# Patient Record
Sex: MALE | Race: BLACK OR AFRICAN AMERICAN | Employment: UNEMPLOYED | URBAN - METROPOLITAN AREA
[De-identification: names, ages, dates, MRNs, and addresses within clinical notes are randomized per-mention and may not be internally consistent; named-entity substitution may affect disease eponyms.]

---

## 2018-01-17 ENCOUNTER — HOSPITAL ENCOUNTER (OUTPATIENT)
Dept: SLEEP CENTER | Age: 53
Discharge: HOME | End: 2018-01-17
Attending: INTERNAL MEDICINE | Admitting: INTERNAL MEDICINE

## 2018-01-17 VITALS
DIASTOLIC BLOOD PRESSURE: 89 MMHG | WEIGHT: 233.3 LBS | HEIGHT: 71 IN | HEART RATE: 72 BPM | BODY MASS INDEX: 32.66 KG/M2 | SYSTOLIC BLOOD PRESSURE: 136 MMHG

## 2018-01-17 DIAGNOSIS — G47.33 OSA (OBSTRUCTIVE SLEEP APNEA): Primary | ICD-10-CM

## 2018-01-17 RX ORDER — LANCETS 28 GAUGE
EACH MISCELLANEOUS
COMMUNITY

## 2018-01-17 RX ORDER — AMLODIPINE BESYLATE 5 MG/1
5 TABLET ORAL
COMMUNITY
Start: 2017-11-18

## 2018-01-17 RX ORDER — CARVEDILOL 12.5 MG/1
12.5 TABLET ORAL
COMMUNITY
Start: 2017-11-17 | End: 2020-11-25

## 2018-01-17 RX ORDER — BUPROPION HYDROCHLORIDE 150 MG/1
TABLET, EXTENDED RELEASE ORAL
COMMUNITY
Start: 2017-11-17

## 2018-01-17 RX ORDER — ATORVASTATIN CALCIUM 40 MG/1
40 TABLET, FILM COATED ORAL
COMMUNITY

## 2018-01-17 RX ORDER — PANTOPRAZOLE SODIUM 40 MG/1
40 TABLET, DELAYED RELEASE ORAL
COMMUNITY
Start: 2014-09-15 | End: 2020-11-25

## 2018-01-17 RX ORDER — GLIPIZIDE 10 MG/1
1 TABLET, FILM COATED, EXTENDED RELEASE ORAL
COMMUNITY
End: 2020-11-25

## 2018-01-17 RX ORDER — VALSARTAN AND HYDROCHLOROTHIAZIDE 160; 12.5 MG/1; MG/1
1 TABLET, FILM COATED ORAL
COMMUNITY
End: 2020-11-25

## 2018-01-17 RX ORDER — PREGABALIN 50 MG/1
CAPSULE ORAL
COMMUNITY
Start: 2015-11-23 | End: 2020-11-25

## 2018-01-17 ASSESSMENT — SLEEP AND FATIGUE QUESTIONNAIRES
HOW LIKELY ARE YOU TO NOD OFF OR FALL ASLEEP WHILE SITTING AND READING: 3
HOW LIKELY ARE YOU TO NOD OFF OR FALL ASLEEP WHILE LYING DOWN TO REST IN THE AFTERNOON WHEN CIRCUMSTANCES PERMIT: 2
HOW LIKELY ARE YOU TO NOD OFF OR FALL ASLEEP WHEN YOU ARE A PASSENGER IN A CAR FOR AN HOUR WITHOUT A BREAK: 3
HOW LIKELY ARE YOU TO NOD OFF OR FALL ASLEEP IN A CAR, WHILE STOPPED FOR A FEW MINUTES IN TRAFFIC: 3
HOW LIKELY ARE YOU TO NOD OFF OR FALL ASLEEP WHILE SITTING INACTIVE IN A PUBLIC PLACE: 3
NECK CIRCUMFERENCE (INCHES): 17.5
HOW LIKELY ARE YOU TO NOD OFF OR FALL ASLEEP WHILE SITTING QUIETLY AFTER LUNCH WITHOUT ALCOHOL: 2
HOW LIKELY ARE YOU TO NOD OFF OR FALL ASLEEP WHILE SITTING AND TALKING TO SOMEONE: 3
HOW LIKELY ARE YOU TO NOD OFF OR FALL ASLEEP WHILE WATCHING TV: 3
ESS TOTAL SCORE: 22

## 2018-01-17 NOTE — PROGRESS NOTES
Tolerates Well   []  Patient Does Not Tolerate     []  Patient Uses CPAP      []  More Than 4 Hours      []  Less Than 4 Hours  []  CPAP/BPAP/ASV Pressure Readings   []  CPAP Pressure      cm H20   []  BPAP Pressure       cm H20   []  ASV Pressure         cm H20      Assessment:      Diagnosis:   ANA MARÍA;                                     Hypersomnia       Plan:        Sleep Study:     [x]  Sleep hygiene/ relaxation methods & CBTi principles review with patient     []  HST - Home Sleep Study   [x]  PSG - Overnight Diagnostic Polysomnogram     []  CPAP Titration    [] Split Night Study    [] BiLevel Titration    [] ASV - Auto-Servo Ventilation Titration       []  MSLT - Multiple Sleep Latency Test   []  MWT - Maintenance of Wakefulness Test    CPAP Therapy:     []  Patient to be seen for new mask fitting/desensitization   []  AutoPAP Titration    []  CPAP supplies and equipment at ________cmH2O    []  Continue same CPAP pressure   []  Change CPAP pressure to _______cm H2O   []  CPAP supplies only, no pressure change   []  Refer for an oral appliance       Medications:       []  Continue current medication    []  Add Medication:  ________________    Follow-Up:     []  No follow up required. Patient to return as needed. []  2 weeks   []  4 weeks   []  2 months   []  4 months   []  6 months   []  1 year for CPAP compliance evaluation. Patient to return sooner, as needed. [x]  Follow up after sleep study   []  Other: ______________    No orders of the defined types were placed in this encounter.          Electronically signed by Terri Kussmaul, MD on 1/17/2018 at 12:06 PM

## 2018-02-01 ENCOUNTER — HOSPITAL ENCOUNTER (OUTPATIENT)
Dept: SLEEP CENTER | Age: 53
Discharge: HOME | End: 2018-02-01
Attending: PSYCHIATRY & NEUROLOGY | Admitting: PSYCHIATRY & NEUROLOGY

## 2018-02-01 VITALS — HEIGHT: 71 IN | BODY MASS INDEX: 32.62 KG/M2 | WEIGHT: 233 LBS

## 2018-02-01 ASSESSMENT — SLEEP AND FATIGUE QUESTIONNAIRES
NECK CIRCUMFERENCE (INCHES): 17.5
HOW LIKELY ARE YOU TO NOD OFF OR FALL ASLEEP WHILE SITTING QUIETLY AFTER LUNCH WITHOUT ALCOHOL: 2
HOW LIKELY ARE YOU TO NOD OFF OR FALL ASLEEP IN A CAR, WHILE STOPPED FOR A FEW MINUTES IN TRAFFIC: 3
HOW LIKELY ARE YOU TO NOD OFF OR FALL ASLEEP WHILE LYING DOWN TO REST IN THE AFTERNOON WHEN CIRCUMSTANCES PERMIT: 2
HOW LIKELY ARE YOU TO NOD OFF OR FALL ASLEEP WHEN YOU ARE A PASSENGER IN A CAR FOR AN HOUR WITHOUT A BREAK: 3
HOW LIKELY ARE YOU TO NOD OFF OR FALL ASLEEP WHILE SITTING AND TALKING TO SOMEONE: 3
HOW LIKELY ARE YOU TO NOD OFF OR FALL ASLEEP WHILE WATCHING TV: 3
HOW LIKELY ARE YOU TO NOD OFF OR FALL ASLEEP WHILE SITTING INACTIVE IN A PUBLIC PLACE: 3
HOW LIKELY ARE YOU TO NOD OFF OR FALL ASLEEP WHILE SITTING AND READING: 3
ESS TOTAL SCORE: 22

## 2018-02-08 NOTE — PROGRESS NOTES
Results for the most recent sleep study on Barney Crews  1965 are finalized and available. Please see media tab.     Electronically signed by Jackson Tamez on 2/8/2018 at 8:40 AM

## 2018-04-25 ENCOUNTER — HOSPITAL ENCOUNTER (OUTPATIENT)
Dept: SLEEP CENTER | Age: 53
Discharge: HOME | End: 2018-04-25
Attending: PSYCHIATRY & NEUROLOGY | Admitting: PSYCHIATRY & NEUROLOGY

## 2018-04-25 ASSESSMENT — SLEEP AND FATIGUE QUESTIONNAIRES
HOW LIKELY ARE YOU TO NOD OFF OR FALL ASLEEP WHILE SITTING AND READING: 0
HOW LIKELY ARE YOU TO NOD OFF OR FALL ASLEEP WHILE SITTING INACTIVE IN A PUBLIC PLACE: 0
HOW LIKELY ARE YOU TO NOD OFF OR FALL ASLEEP IN A CAR, WHILE STOPPED FOR A FEW MINUTES IN TRAFFIC: 0
HOW LIKELY ARE YOU TO NOD OFF OR FALL ASLEEP WHILE LYING DOWN TO REST IN THE AFTERNOON WHEN CIRCUMSTANCES PERMIT: 3
HOW LIKELY ARE YOU TO NOD OFF OR FALL ASLEEP WHILE WATCHING TV: 1
HOW LIKELY ARE YOU TO NOD OFF OR FALL ASLEEP WHILE SITTING QUIETLY AFTER LUNCH WITHOUT ALCOHOL: 1
HOW LIKELY ARE YOU TO NOD OFF OR FALL ASLEEP WHILE SITTING AND TALKING TO SOMEONE: 0
ESS TOTAL SCORE: 5
HOW LIKELY ARE YOU TO NOD OFF OR FALL ASLEEP WHEN YOU ARE A PASSENGER IN A CAR FOR AN HOUR WITHOUT A BREAK: 0

## 2018-11-02 ENCOUNTER — TELEPHONE (OUTPATIENT)
Dept: SLEEP CENTER | Age: 53
End: 2018-11-02

## 2020-11-25 ENCOUNTER — HOSPITAL ENCOUNTER (OUTPATIENT)
Dept: SLEEP CENTER | Age: 55
Discharge: HOME | End: 2020-11-25
Payer: COMMERCIAL

## 2020-11-25 VITALS — BODY MASS INDEX: 34.86 KG/M2 | WEIGHT: 249 LBS | HEIGHT: 71 IN

## 2020-11-25 PROBLEM — E66.9 OBESITY (BMI 30-39.9): Status: ACTIVE | Noted: 2020-11-25

## 2020-11-25 PROBLEM — G47.10 HYPERSOMNIA: Status: ACTIVE | Noted: 2020-11-25

## 2020-11-25 PROBLEM — Z87.891 EX-SMOKER: Status: ACTIVE | Noted: 2020-11-25

## 2020-11-25 PROBLEM — G47.33 OSA (OBSTRUCTIVE SLEEP APNEA): Status: ACTIVE | Noted: 2020-11-25

## 2020-11-25 PROCEDURE — 99423 OL DIG E/M SVC 21+ MIN: CPT | Performed by: INTERNAL MEDICINE

## 2020-11-25 PROCEDURE — 99211 OFF/OP EST MAY X REQ PHY/QHP: CPT

## 2020-11-25 RX ORDER — RIVAROXABAN 2.5 MG/1
2.5 TABLET, FILM COATED ORAL 2 TIMES DAILY
COMMUNITY

## 2020-11-25 RX ORDER — ALLOPURINOL 100 MG/1
100 TABLET ORAL DAILY
COMMUNITY

## 2020-11-25 RX ORDER — NIFEDIPINE 60 MG/1
60 TABLET, FILM COATED, EXTENDED RELEASE ORAL DAILY
COMMUNITY

## 2020-11-25 RX ORDER — CLOPIDOGREL BISULFATE 75 MG/1
75 TABLET ORAL DAILY
COMMUNITY

## 2020-11-25 RX ORDER — OMEPRAZOLE 40 MG/1
40 CAPSULE, DELAYED RELEASE ORAL DAILY
COMMUNITY

## 2020-11-25 ASSESSMENT — SLEEP AND FATIGUE QUESTIONNAIRES
HOW LIKELY ARE YOU TO NOD OFF OR FALL ASLEEP WHILE SITTING QUIETLY AFTER LUNCH WITHOUT ALCOHOL: 3
HOW LIKELY ARE YOU TO NOD OFF OR FALL ASLEEP WHILE WATCHING TV: 1
HOW LIKELY ARE YOU TO NOD OFF OR FALL ASLEEP WHILE SITTING INACTIVE IN A PUBLIC PLACE: 1
HOW LIKELY ARE YOU TO NOD OFF OR FALL ASLEEP IN A CAR, WHILE STOPPED FOR A FEW MINUTES IN TRAFFIC: 2
HOW LIKELY ARE YOU TO NOD OFF OR FALL ASLEEP WHEN YOU ARE A PASSENGER IN A CAR FOR AN HOUR WITHOUT A BREAK: 1
HOW LIKELY ARE YOU TO NOD OFF OR FALL ASLEEP WHILE SITTING AND READING: 1
HOW LIKELY ARE YOU TO NOD OFF OR FALL ASLEEP WHILE LYING DOWN TO REST IN THE AFTERNOON WHEN CIRCUMSTANCES PERMIT: 3
ESS TOTAL SCORE: 13
HOW LIKELY ARE YOU TO NOD OFF OR FALL ASLEEP WHILE SITTING AND TALKING TO SOMEONE: 1

## 2020-11-25 NOTE — CONSULTS
Phani Barrios MD, Arin Wells MD, Darian Trujillo MD, [facility]      [address], [address] [facility]   [place]: [phone]  F: [phone]     Subjective:     Patient ID: Anastasiya Yoo is a 50 y.o. male, referred to the sleep center for consultation with a sleep specialist.     Reason for Consultation/Chief Complaint:   Chief Complaint   Patient presents with    Sleep Apnea       Referring physician:  Candie Garcia    He is doing a  VIDEO CONSULT. He was seen by Dr. Arin Wells in 2018. He had a split night study done on 02/01/2018 which showed that he has severe ANA MARÍA with an AHI of 76.7 and desat to 73%. He required a CPAP of 12 cm h20. He used it for some time but has not used it for about 1 year.  He has no supplies    Symptoms:   [x]  Snoring                                                                    [x]  Dry Mouth  [x]  Choking                                                                   []  Morning Headaches  [x]  Gasping for Air                                                        []  Trouble Falling asleep  [x]  Tired during the daytime                                         []  Trouble Staying Asleep  [x]  Tired when you wake up                                         [x]  Weight Gain in Last 5 Years  [x]  Wake up frequently at night                                    []  Weight Loss in Last 5 Years  []  Shortness Of Breath                                               []  Shift Worker  []  Coughing                                                                [x]  Smoker (Previous or Current) 1/2 pk per day for 41 days which he quit 3 years ago  []  Chest Pain                                                              []  Anxiety  []  Trouble keeping your legs still at night                   []  Depression  []  Kicking your legs in your sleep []  Insomnia            []  Other: 20 lb weight gain    Significant Co-morbidities:  []  Congestive Heart Failure     []  COPD         []  Stroke (Past 30 Days)      []  Supplemental Oxygen Usage       []  Cognitive Impairment      []  Neuromuscular Problems  []  Epilepsy/Neurological Disorders         Duration of Sleep Problems:    History:    Social History     Socioeconomic History    Marital status: Legally      Spouse name: Not on file    Number of children: Not on file    Years of education: Not on file    Highest education level: Not on file   Occupational History    Not on file   Social Needs    Financial resource strain: Not on file    Food insecurity     Worry: Not on file     Inability: Not on file    Transportation needs     Medical: Not on file     Non-medical: Not on file   Tobacco Use    Smoking status: Former Smoker     Packs/day: 0.50     Types: Cigarettes     Quit date: 1/1/2018     Years since quitting: 3.0    Smokeless tobacco: Never Used   Substance and Sexual Activity    Alcohol use: No    Drug use: Never    Sexual activity: Not on file   Lifestyle    Physical activity     Days per week: Not on file     Minutes per session: Not on file    Stress: Not on file   Relationships    Social connections     Talks on phone: Not on file     Gets together: Not on file     Attends [employer] service: Not on file     Active member of club or organization: Not on file     Attends meetings of clubs or organizations: Not on file     Relationship status: Not on file    Intimate partner violence     Fear of current or ex partner: Not on file     Emotionally abused: Not on file     Physically abused: Not on file     Forced sexual activity: Not on file   Other Topics Concern    Not on file   Social History Narrative    Not on file       Prior to Admission medications    Medication Sig Start Date End Date Taking?  Authorizing Provider   NIFEdipine (ADALAT CC) 60 MG extended release tablet Take 60 mg by mouth daily   Yes Historical Provider, MD   rivaroxaban (XARELTO) 2.5 MG TABS tablet Take 2.5 mg by mouth 2 times daily   Yes Historical Provider, MD   allopurinol (ZYLOPRIM) 100 MG tablet Take 100 mg by mouth daily   Yes Historical Provider, MD   clopidogrel (PLAVIX) 75 MG tablet Take 75 mg by mouth daily   Yes Historical Provider, MD   omeprazole (PRILOSEC) 40 MG delayed release capsule Take 40 mg by mouth daily   Yes Historical Provider, MD   aspirin 81 MG tablet Take 81 mg by mouth daily  9/26/17  Yes Historical Provider, MD   atorvastatin (LIPITOR) 40 MG tablet Take 40 mg by mouth   Yes Historical Provider, MD   amLODIPine (NORVASC) 5 MG tablet Take 5 mg by mouth 11/18/17  Yes Historical Provider, MD   Dulaglutide 1.5 MG/0.5ML SOPN Inject into the skin   Yes Historical Provider, MD SKINNERYLE LANCETS MISC by Does not apply route   Yes Historical Provider, MD   insulin glargine (LANTUS) 100 UNIT/ML injection vial Inject 10 Units into the skin daily (with breakfast)    Historical Provider, MD   buPROPion (WELLBUTRIN SR) 150 MG extended release tablet 150 mg daily for 3 days then 150 mg twice daily  Indications: Smoking Cessation 11/17/17   Historical Provider, MD   acetaminophen (TYLENOL) 325 MG tablet Take 650 mg by mouth every 6 hours as needed for Pain. Historical Provider, MD       Allergies as of 11/25/2020    (No Known Allergies)       Patient Active Problem List   Diagnosis    ANA MARÍA (obstructive sleep apnea)    Hypersomnia    Obesity (BMI 30-39. 9)    Ex-smoker       Past Medical History:   Diagnosis Date    Diabetes mellitus ([facility] [address]        No past surgical history on file. No family history on file. Objective:     Vitals:    11/25/20 1422   Weight: 249 lb (112.9 kg)   Height: 5' 11\" (1.803 m)     Body mass index is 34.73 kg/m². Neck - Neck circumference: 17  Inches  [place] - Total score: 13    REVIEW OF SYSTEMS:  General: No distress.    Constitutional: Negative for fever, no distress. HENT: Negative for sore throat, external appearance of ears and nose normal.   Eyes: Negative for redness. Respiratory: Negative for dyspnea, cough. Cardiovascular: Negative for chest pain. Gastrointestinal: Negative for vomiting, diarrhea. Musculoskeletal: Negative for arthralgias. Skin: Negative for rash. Neurological: Negative for syncope. Mallampati Airway Classification:   []1 []2 [x]3 []4          Previous CPAP Usage:    []  Patient has never worn PAP. []  Patient has worn PAP previously but discontinued use. []  Current PAP user. Assessment:      Diagnosis:       Plan:     1.  2.  3Shira Peralta is a 50 y.o. male being evaluated by a Virtual Visit (video visit) encounter to address concerns as mentioned above. A caregiver was present when appropriate. Due to this being a TeleHealth encounter (During FYBEH-38 public health emergency), evaluation of the following organ systems was limited: Vitals/Constitutional/EENT/Resp/CV/GI//MS/Neuro/Skin/Heme-Lymph-Imm. Pursuant to the emergency declaration under the [address] [place] [address] authority and the [employer] and [employer] Act, this Virtual Visit was conducted with patient's (and/or legal guardian's) consent, to reduce the patient's risk of exposure to COVID-19 and provide necessary medical care. The patient (and/or legal guardian) has also been advised to contact this office for worsening conditions or problems, and seek emergency medical treatment and/or call 911 if deemed necessary. Patient identification was verified at the start of the visit: Yes    Services were provided through a video synchronous discussion virtually to substitute for in-person clinic visit. Patient and provider were located at their individual homes. --Gilford Lime, MD on 2/5/2021 at 9:23 AM    An electronic signature was used to authenticate this note. Orders Placed This Encounter   Procedures    DME Order for CPAP as OP          Electronically signed by Mark Galarza MD on 2/5/2021 at 9:23 AM

## 2020-11-25 NOTE — ASSESSMENT & PLAN NOTE
He still has symptoms of ANA MARÍA  Advised to use Auto CPAP  Loose weight  I need a 2 week download data

## 2021-02-05 ENCOUNTER — VIRTUAL VISIT (OUTPATIENT)
Dept: PULMONOLOGY | Age: 56
End: 2021-02-05
Payer: COMMERCIAL

## 2021-02-05 DIAGNOSIS — G47.10 HYPERSOMNIA: ICD-10-CM

## 2021-02-05 DIAGNOSIS — E66.9 OBESITY (BMI 30-39.9): ICD-10-CM

## 2021-02-05 DIAGNOSIS — Z87.891 EX-SMOKER: ICD-10-CM

## 2021-02-05 DIAGNOSIS — G47.33 OSA (OBSTRUCTIVE SLEEP APNEA): ICD-10-CM

## 2021-02-05 PROCEDURE — 99443 PR PHYS/QHP TELEPHONE EVALUATION 21-30 MIN: CPT | Performed by: INTERNAL MEDICINE

## 2021-02-05 RX ORDER — INSULIN GLARGINE 100 [IU]/ML
10 INJECTION, SOLUTION SUBCUTANEOUS
COMMUNITY

## 2021-02-05 ASSESSMENT — ENCOUNTER SYMPTOMS
ABDOMINAL DISTENTION: 0
COUGH: 0
SHORTNESS OF BREATH: 0
BACK PAIN: 0
ABDOMINAL PAIN: 0
EYE ITCHING: 0
EYE DISCHARGE: 0

## 2021-02-05 NOTE — PROGRESS NOTES
Christiano Coroneler  1965  Referring Provider: Rich Lynch    Subjective:     Chief Complaint   Patient presents with    Results     2 month sleep lab results. ELADIA Dimas is a 50 y.o. male is doing a telephone follow up visit. He has severe ANA MARÍA. He is on a Auto CPAP which he was using it till he went to the hospital for 1 week. He says that it is helping a little. He has no loss of weight. He is still sleepy during the day time. His 2 week download data showed that his residual AHI is 2.8 and leak is 12.9 and his 95th percentile pressure is 13.5 cm h20. Current Outpatient Medications   Medication Sig Dispense Refill    insulin glargine (LANTUS) 100 UNIT/ML injection vial Inject 10 Units into the skin daily (with breakfast)      NIFEdipine (ADALAT CC) 60 MG extended release tablet Take 60 mg by mouth daily      rivaroxaban (XARELTO) 2.5 MG TABS tablet Take 2.5 mg by mouth 2 times daily      allopurinol (ZYLOPRIM) 100 MG tablet Take 100 mg by mouth daily      clopidogrel (PLAVIX) 75 MG tablet Take 75 mg by mouth daily      omeprazole (PRILOSEC) 40 MG delayed release capsule Take 40 mg by mouth daily      aspirin 81 MG tablet Take 81 mg by mouth daily       atorvastatin (LIPITOR) 40 MG tablet Take 40 mg by mouth      amLODIPine (NORVASC) 5 MG tablet Take 5 mg by mouth      buPROPion (WELLBUTRIN SR) 150 MG extended release tablet 150 mg daily for 3 days then 150 mg twice daily  Indications: Smoking Cessation      Dulaglutide 1.5 MG/0.5ML SOPN Inject into the skin      FREESTYLE LANCETS MISC by Does not apply route      acetaminophen (TYLENOL) 325 MG tablet Take 650 mg by mouth every 6 hours as needed for Pain. No current facility-administered medications for this visit. No Known Allergies    Past Medical History:   Diagnosis Date    Diabetes mellitus ([facility] [address]        History reviewed. No pertinent surgical history.     Social History     Socioeconomic History Psychiatric/Behavioral: Negative for agitation and behavioral problems. Objective: There were no vitals taken for this visit. There is no height or weight on file to calculate BMI. Sleep Medicine 11/25/2020 4/25/2018 2/1/2018 1/17/2018   Sitting and reading 1 0 3 3   Watching TV 1 1 3 3   Sitting, inactive in a public place (e.g. a theatre or a meeting) 1 0 3 3   As a passenger in a car for an hour without a break 1 0 3 3   Lying down to rest in the afternoon when circumstances permit 3 3 2 2   Sitting and talking to someone 1 0 3 3   Sitting quietly after a lunch without alcohol 3 1 2 2   In a car, while stopped for a few minutes in traffic 2 0 3 3   Total score 13 5 22 22   Neck circumference 17 - 17.5 17.5             Radiology: None    Assessment and Plan     Problem List        Pulmonary Problems    ANA MARÍA (obstructive sleep apnea)     Advised to be compliant with the CPAP  He still has the symptoms. I'll check the 2 week download data  Loose weight            Other    Hypersomnia     Advised to be compliant with the CPAP  Need 2 week download data  Loose weight         Obesity (BMI 30-39. 9)     Advised to loose weight with diet and exercise           Relevant Medications    Dulaglutide 1.5 MG/0.5ML SOPN    insulin glargine (LANTUS) 100 UNIT/ML injection vial    Ex-smoker     Advised to c/w quitting smoking                    Return in about 2 months (around 4/5/2021) for 2 week download data.      Progress notes sent to the referring Provider    Shital Koenig MD  2/5/2021  9:28 AM Gibran Freeman is a 50 y.o. male being evaluated by a Virtual Visit (video visit) encounter to address concerns as mentioned above. A caregiver was present when appropriate. Due to this being a TeleHealth encounter (During SWXDX-16 public health emergency), evaluation of the following organ systems was limited: Vitals/Constitutional/EENT/Resp/CV/GI//MS/Neuro/Skin/Heme-Lymph-Imm. Pursuant to the emergency declaration under the [address] [place] [address] and the [employer] and [employer] Act, this Virtual Visit was conducted with patient's (and/or legal guardian's) consent, to reduce the patient's risk of exposure to COVID-19 and provide necessary medical care. The patient (and/or legal guardian) has also been advised to contact this office for worsening conditions or problems, and seek emergency medical treatment and/or call 911 if deemed necessary. Patient identification was verified at the start of the visit: Yes    Total time spent for this encounter: 21 minutes    Services were provided through a video synchronous discussion virtually to substitute for in-person clinic visit. Patient and provider were located at their individual homes. --James Black MD on 2/5/2021 at 9:29 AM    An electronic signature was used to authenticate this note.

## 2021-02-05 NOTE — ASSESSMENT & PLAN NOTE
Advised to be compliant with the CPAP  He still has the symptoms.   I'll check the 2 week download data  Loose weight

## 2021-04-09 ENCOUNTER — VIRTUAL VISIT (OUTPATIENT)
Dept: PULMONOLOGY | Age: 56
End: 2021-04-09
Payer: COMMERCIAL

## 2021-04-09 DIAGNOSIS — Z87.891 EX-SMOKER: ICD-10-CM

## 2021-04-09 DIAGNOSIS — G47.10 HYPERSOMNIA: ICD-10-CM

## 2021-04-09 DIAGNOSIS — E66.9 OBESITY (BMI 30-39.9): ICD-10-CM

## 2021-04-09 DIAGNOSIS — G47.33 OSA (OBSTRUCTIVE SLEEP APNEA): ICD-10-CM

## 2021-04-09 PROCEDURE — 99443 PR PHYS/QHP TELEPHONE EVALUATION 21-30 MIN: CPT | Performed by: INTERNAL MEDICINE

## 2021-04-09 ASSESSMENT — ENCOUNTER SYMPTOMS
COUGH: 0
EYE ITCHING: 0
SHORTNESS OF BREATH: 0
EYE DISCHARGE: 0
ABDOMINAL DISTENTION: 0
ABDOMINAL PAIN: 0
BACK PAIN: 0

## 2021-04-09 NOTE — PROGRESS NOTES
Jerman Bailey  1965  Referring Provider:     Subjective:     Chief Complaint   Patient presents with   Scout Kiran is a 50 y.o. male is doing a telephone follow up visit. He has severe ANA MARÍA. He was prescribed a Auto CPA which he was not using it for 2 months. He is planning to use it now. He has problems with the mask. He has no loss of weight. He is sleepy during the day time. Current Outpatient Medications   Medication Sig Dispense Refill    insulin glargine (LANTUS) 100 UNIT/ML injection vial Inject 10 Units into the skin daily (with breakfast)      NIFEdipine (ADALAT CC) 60 MG extended release tablet Take 60 mg by mouth daily      rivaroxaban (XARELTO) 2.5 MG TABS tablet Take 2.5 mg by mouth 2 times daily      allopurinol (ZYLOPRIM) 100 MG tablet Take 100 mg by mouth daily      clopidogrel (PLAVIX) 75 MG tablet Take 75 mg by mouth daily      omeprazole (PRILOSEC) 40 MG delayed release capsule Take 40 mg by mouth daily      aspirin 81 MG tablet Take 81 mg by mouth daily       atorvastatin (LIPITOR) 40 MG tablet Take 40 mg by mouth      amLODIPine (NORVASC) 5 MG tablet Take 5 mg by mouth      buPROPion (WELLBUTRIN SR) 150 MG extended release tablet 150 mg daily for 3 days then 150 mg twice daily  Indications: Smoking Cessation      Dulaglutide 1.5 MG/0.5ML SOPN Inject into the skin      FREESTYLE LANCETS MISC by Does not apply route      acetaminophen (TYLENOL) 325 MG tablet Take 650 mg by mouth every 6 hours as needed for Pain. No current facility-administered medications for this visit. No Known Allergies    Past Medical History:   Diagnosis Date    Diabetes mellitus ([address]        No past surgical history on file.     Social History     Socioeconomic History    Marital status: Legally      Spouse name: Not on file    Number of children: Not on file    Years of education: Not on file    Highest education level: Not on file   Occupational History    Not on file   Social Needs    Financial resource strain: Not on file    Food insecurity     Worry: Not on file     Inability: Not on file    Transportation needs     Medical: Not on file     Non-medical: Not on file   Tobacco Use    Smoking status: Former Smoker     Packs/day: 0.50     Types: Cigarettes     Quit date: 1/1/2018     Years since quitting: 3.2    Smokeless tobacco: Never Used   Substance and Sexual Activity    Alcohol use: No    Drug use: Never    Sexual activity: Not on file   Lifestyle    Physical activity     Days per week: Not on file     Minutes per session: Not on file    Stress: Not on file   Relationships    Social connections     Talks on phone: Not on file     Gets together: Not on file     Attends [employer] service: Not on file     Active member of club or organization: Not on file     Attends meetings of clubs or organizations: Not on file     Relationship status: Not on file    Intimate partner violence     Fear of current or ex partner: Not on file     Emotionally abused: Not on file     Physically abused: Not on file     Forced sexual activity: Not on file   Other Topics Concern    Not on file   Social History Narrative    Not on file       Review of Systems   Constitutional: Positive for fatigue. HENT: Negative for congestion and postnasal drip. Eyes: Negative for discharge and itching. Respiratory: Negative for cough and shortness of breath. Cardiovascular: Negative for chest pain and leg swelling. Gastrointestinal: Negative for abdominal distention and abdominal pain. Endocrine: Negative for cold intolerance and heat intolerance. Genitourinary: Negative for enuresis and frequency. Musculoskeletal: Negative for arthralgias and back pain. Allergic/Immunologic: Negative for environmental allergies and food allergies. Neurological: Negative for numbness and headaches. Hematological: Negative for adenopathy.    Psychiatric/Behavioral: Negative for agitation and behavioral problems. Objective: There were no vitals taken for this visit. There is no height or weight on file to calculate BMI. Sleep Medicine 11/25/2020 4/25/2018 2/1/2018 1/17/2018   Sitting and reading 1 0 3 3   Watching TV 1 1 3 3   Sitting, inactive in a public place (e.g. a theatre or a meeting) 1 0 3 3   As a passenger in a car for an hour without a break 1 0 3 3   Lying down to rest in the afternoon when circumstances permit 3 3 2 2   Sitting and talking to someone 1 0 3 3   Sitting quietly after a lunch without alcohol 3 1 2 2   In a car, while stopped for a few minutes in traffic 2 0 3 3   Total score 13 5 22 22   Neck circumference 17 - 17.5 17.5       Radiology: None    Assessment and Plan     Problem List        Pulmonary Problems    ANA MARÍA (obstructive sleep apnea)     Advised to be compliant with Auto CPAP  Mask fitting trial  Loose weight  I need a 2 week download data            Other    Hypersomnia     Advised to be compliant with the Auto CPAP  Loose weight  Mask fitting trial         Obesity (BMI 30-39. 9)     Advised to loose weight with diet and exercise           Relevant Medications    Dulaglutide 1.5 MG/0.5ML SOPN    insulin glargine (LANTUS) 100 UNIT/ML injection vial    Ex-smoker     Advised to c/w quitting smoking                    Return in about 2 months (around 6/9/2021) for 2 week download data. Progress notes sent to the referring Provider    Stuart Nguyen is a 50 y.o. male being evaluated by a Virtual Visit (video visit) encounter to address concerns as mentioned above. A caregiver was present when appropriate. Due to this being a TeleHealth encounter (During UEIXY-74 public health emergency), evaluation of the following organ systems was limited: Vitals/Constitutional/EENT/Resp/CV/GI//MS/Neuro/Skin/Heme-Lymph-Imm.   Pursuant to the emergency declaration under the [address] [facility] waiver authority and the Coronavirus Preparedness and Response Supplemental Appropriations Act, this Virtual Visit was conducted with patient's (and/or legal guardian's) consent, to reduce the patient's risk of exposure to COVID-19 and provide necessary medical care. The patient (and/or legal guardian) has also been advised to contact this office for worsening conditions or problems, and seek emergency medical treatment and/or call 911 if deemed necessary. Patient identification was verified at the start of the visit: Yes    Total time spent for this encounter: 21 minutes    Services were provided through a video synchronous discussion virtually to substitute for in-person clinic visit. Patient and provider were located at their individual homes. --Shyam Weller MD on 4/9/2021 at 10:24 AM    An electronic signature was used to authenticate this note.      Shyam Weller MD  4/9/2021  10:24 AM

## 2021-06-11 ENCOUNTER — VIRTUAL VISIT (OUTPATIENT)
Dept: PULMONOLOGY | Age: 56
End: 2021-06-11
Payer: COMMERCIAL

## 2021-06-11 DIAGNOSIS — Z87.891 EX-SMOKER: ICD-10-CM

## 2021-06-11 DIAGNOSIS — G47.33 OSA (OBSTRUCTIVE SLEEP APNEA): ICD-10-CM

## 2021-06-11 DIAGNOSIS — G47.10 HYPERSOMNIA: ICD-10-CM

## 2021-06-11 DIAGNOSIS — E66.9 OBESITY (BMI 30-39.9): ICD-10-CM

## 2021-06-11 PROCEDURE — 99214 OFFICE O/P EST MOD 30 MIN: CPT | Performed by: INTERNAL MEDICINE

## 2021-06-11 ASSESSMENT — ENCOUNTER SYMPTOMS
BACK PAIN: 0
EYE ITCHING: 0
EYE DISCHARGE: 0
SHORTNESS OF BREATH: 0
ABDOMINAL DISTENTION: 0
COUGH: 0
ABDOMINAL PAIN: 0

## 2021-06-11 NOTE — PROGRESS NOTES
Dulce Maria Galvan  1965  Referring Provider: Dr. Whitney Caldwell    Subjective:     Chief Complaint   Patient presents with   Aníbal George is a 50 y.o. male is doing a telephone follow up visit. He has severe ANA MARÍA. He was prescribed an AutoCPAP but he is not able to use it as he is uncomfortable with the mask. He has no loss of weight. He is still sleepy during the day time. He has a FFM. His 2 week download data showed that he used it for 2/30 days. Current Outpatient Medications   Medication Sig Dispense Refill    insulin glargine (LANTUS) 100 UNIT/ML injection vial Inject 10 Units into the skin daily (with breakfast)      NIFEdipine (ADALAT CC) 60 MG extended release tablet Take 60 mg by mouth daily      rivaroxaban (XARELTO) 2.5 MG TABS tablet Take 2.5 mg by mouth 2 times daily      allopurinol (ZYLOPRIM) 100 MG tablet Take 100 mg by mouth daily      clopidogrel (PLAVIX) 75 MG tablet Take 75 mg by mouth daily      omeprazole (PRILOSEC) 40 MG delayed release capsule Take 40 mg by mouth daily      aspirin 81 MG tablet Take 81 mg by mouth daily       atorvastatin (LIPITOR) 40 MG tablet Take 40 mg by mouth      amLODIPine (NORVASC) 5 MG tablet Take 5 mg by mouth      buPROPion (WELLBUTRIN SR) 150 MG extended release tablet 150 mg daily for 3 days then 150 mg twice daily  Indications: Smoking Cessation      Dulaglutide 1.5 MG/0.5ML SOPN Inject into the skin      FREESTYLE LANCETS MISC by Does not apply route      acetaminophen (TYLENOL) 325 MG tablet Take 650 mg by mouth every 6 hours as needed for Pain. No current facility-administered medications for this visit. No Known Allergies    Past Medical History:   Diagnosis Date    Diabetes mellitus ([facility] [address]        No past surgical history on file.     Social History     Socioeconomic History    Marital status: Legally      Spouse name: Not on file    Number of children: Not on file    Years of education: Not on file  Highest education level: Not on file   Occupational History    Not on file   Tobacco Use    Smoking status: Former Smoker     Packs/day: 0.50     Types: Cigarettes     Quit date: 1/1/2018     Years since quitting: 3.4    Smokeless tobacco: Never Used   Vaping Use    Vaping Use: Never used   Substance and Sexual Activity    Alcohol use: No    Drug use: Never    Sexual activity: Not on file   Other Topics Concern    Not on file   Social History Narrative    Not on file     Social Determinants of Health     Financial Resource Strain:     Difficulty of Paying Living Expenses:    Food Insecurity:     Worried About Running Out of Food in the Last Year:     [address] in the Last Year:    Transportation Needs:     Lack of Transportation (Medical):  Lack of Transportation (Non-Medical):    Physical Activity:     Days of Exercise per Week:     Minutes of Exercise per Session:    Stress:     Feeling of Stress :    Social Connections:     Frequency of Communication with Friends and Family:     Frequency of Social Gatherings with Friends and Family:     Attends Zoroastrian Services:     Active Member of Clubs or Organizations:     Attends Club or Organization Meetings:     Marital Status:    Intimate Partner Violence:     Fear of Current or Ex-Partner:     Emotionally Abused:     Physically Abused:     Sexually Abused:        Review of Systems   Constitutional: Positive for fatigue. HENT: Negative for congestion and postnasal drip. Eyes: Negative for discharge and itching. Respiratory: Negative for cough and shortness of breath. Cardiovascular: Negative for chest pain and leg swelling. Gastrointestinal: Negative for abdominal distention and abdominal pain. Endocrine: Negative for cold intolerance and heat intolerance. Genitourinary: Negative for enuresis and frequency. Musculoskeletal: Negative for arthralgias and back pain.    Allergic/Immunologic: Negative for environmental Vitals/Constitutional/EENT/Resp/CV/GI//MS/Neuro/Skin/Heme-Lymph-Imm. Pursuant to the emergency declaration under the [address] [place] [address] and the [employer] and [employer] Act, this Virtual Visit was conducted with patient's (and/or legal guardian's) consent, to reduce the patient's risk of exposure to COVID-19 and provide necessary medical care. The patient (and/or legal guardian) has also been advised to contact this office for worsening conditions or problems, and seek emergency medical treatment and/or call 911 if deemed necessary. Patient identification was verified at the start of the visit: Yes    Total time spent for this encounter: 21 minutes    Services were provided through a video synchronous discussion virtually to substitute for in-person clinic visit. Patient and provider were located at their individual homes. --Milagro Madrid MD on 6/11/2021 at 9:31 AM    An electronic signature was used to authenticate this note.      Milagro Madrid MD MD  6/11/2021  9:31 AM

## 2021-07-23 ENCOUNTER — VIRTUAL VISIT (OUTPATIENT)
Dept: PULMONOLOGY | Age: 56
End: 2021-07-23
Payer: COMMERCIAL

## 2021-07-23 DIAGNOSIS — E66.9 OBESITY (BMI 30-39.9): ICD-10-CM

## 2021-07-23 DIAGNOSIS — G47.10 HYPERSOMNIA: ICD-10-CM

## 2021-07-23 DIAGNOSIS — G47.33 OSA (OBSTRUCTIVE SLEEP APNEA): ICD-10-CM

## 2021-07-23 DIAGNOSIS — Z87.891 EX-SMOKER: ICD-10-CM

## 2021-07-23 PROCEDURE — 99214 OFFICE O/P EST MOD 30 MIN: CPT | Performed by: INTERNAL MEDICINE

## 2021-07-23 ASSESSMENT — ENCOUNTER SYMPTOMS
ABDOMINAL DISTENTION: 0
ABDOMINAL PAIN: 0
EYE DISCHARGE: 0
BACK PAIN: 0
COUGH: 0
SHORTNESS OF BREATH: 0
EYE ITCHING: 0

## 2021-07-23 NOTE — ASSESSMENT & PLAN NOTE
He has severe Sb  He is unable to tolerate the Auto CPAP  Loose weight  I will send him for the BIPAP titration study

## 2021-07-23 NOTE — PROGRESS NOTES
Jannie Griffin  1965  Referring Provider: Leda Hendricks    Subjective:     Chief Complaint   Patient presents with    Sleep Apnea       HPI  Tristin Norris is a 50 y.o. male is doing a telephone follow up. He has severe ANA MARÍA. He is not able to tolerate the Auto CPAP. He is uncomfortable. He has no loss of weight. He has a FFM. He is still sleepy and tired during the day time. Current Outpatient Medications   Medication Sig Dispense Refill    insulin glargine (LANTUS) 100 UNIT/ML injection vial Inject 10 Units into the skin daily (with breakfast)      NIFEdipine (ADALAT CC) 60 MG extended release tablet Take 60 mg by mouth daily      rivaroxaban (XARELTO) 2.5 MG TABS tablet Take 2.5 mg by mouth 2 times daily      allopurinol (ZYLOPRIM) 100 MG tablet Take 100 mg by mouth daily      clopidogrel (PLAVIX) 75 MG tablet Take 75 mg by mouth daily      omeprazole (PRILOSEC) 40 MG delayed release capsule Take 40 mg by mouth daily      aspirin 81 MG tablet Take 81 mg by mouth daily       atorvastatin (LIPITOR) 40 MG tablet Take 40 mg by mouth      amLODIPine (NORVASC) 5 MG tablet Take 5 mg by mouth      buPROPion (WELLBUTRIN SR) 150 MG extended release tablet 150 mg daily for 3 days then 150 mg twice daily  Indications: Smoking Cessation      Dulaglutide 1.5 MG/0.5ML SOPN Inject into the skin      FREESTYLE LANCETS MISC by Does not apply route      acetaminophen (TYLENOL) 325 MG tablet Take 650 mg by mouth every 6 hours as needed for Pain. No current facility-administered medications for this visit. No Known Allergies    Past Medical History:   Diagnosis Date    Diabetes mellitus ([facility] [address]        No past surgical history on file.     Social History     Socioeconomic History    Marital status: Legally      Spouse name: Not on file    Number of children: Not on file    Years of education: Not on file    Highest education level: Not on file   Occupational History    Not on file   Tobacco Use    Smoking status: Former Smoker     Packs/day: 0.50     Types: Cigarettes     Quit date: 1/1/2018     Years since quitting: 3.5    Smokeless tobacco: Never Used   Vaping Use    Vaping Use: Never used   Substance and Sexual Activity    Alcohol use: No    Drug use: Never    Sexual activity: Not on file   Other Topics Concern    Not on file   Social History Narrative    Not on file     Social Determinants of Health     Financial Resource Strain:     Difficulty of Paying Living Expenses:    Food Insecurity:     Worried About Running Out of Food in the Last Year:     [address] in the Last Year:    Transportation Needs:     Lack of Transportation (Medical):  Lack of Transportation (Non-Medical):    Physical Activity:     Days of Exercise per Week:     Minutes of Exercise per Session:    Stress:     Feeling of Stress :    Social Connections:     Frequency of Communication with Friends and Family:     Frequency of Social Gatherings with Friends and Family:     Attends Temple Services:     Active Member of Clubs or Organizations:     Attends Club or Organization Meetings:     Marital Status:    Intimate Partner Violence:     Fear of Current or Ex-Partner:     Emotionally Abused:     Physically Abused:     Sexually Abused:        Review of Systems   Constitutional: Positive for fatigue. HENT: Negative for congestion and postnasal drip. Eyes: Negative for discharge and itching. Respiratory: Negative for cough and shortness of breath. Cardiovascular: Negative for chest pain and leg swelling. Gastrointestinal: Negative for abdominal distention and abdominal pain. Endocrine: Negative for cold intolerance and heat intolerance. Genitourinary: Negative for enuresis and frequency. Musculoskeletal: Negative for arthralgias and back pain. Allergic/Immunologic: Negative for environmental allergies and food allergies.    Neurological: Negative for light-headedness and headaches. Hematological: Negative for adenopathy. Psychiatric/Behavioral: Negative for agitation and behavioral problems. Objective: There were no vitals taken for this visit. There is no height or weight on file to calculate BMI. Sleep Medicine 11/25/2020 4/25/2018 2/1/2018 1/17/2018   Sitting and reading 1 0 3 3   Watching TV 1 1 3 3   Sitting, inactive in a public place (e.g. a theatre or a meeting) 1 0 3 3   As a passenger in a car for an hour without a break 1 0 3 3   Lying down to rest in the afternoon when circumstances permit 3 3 2 2   Sitting and talking to someone 1 0 3 3   Sitting quietly after a lunch without alcohol 3 1 2 2   In a car, while stopped for a few minutes in traffic 2 0 3 3   Total score 13 5 22 22   Neck circumference 17 - 17.5 17.5       Radiology: None    Assessment and Plan     Problem List        Pulmonary Problems    ANA MARÍA (obstructive sleep apnea)      He has severe Ana María  He is unable to tolerate the Auto CPAP  Loose weight  I will send him for the BIPAP titration study         Relevant Orders    Sleep Study with PAP Titration       Other    Hypersomnia      Advised to go for the BIPAP titration study  Loose weight         Obesity (BMI 30-39. 9)      Advised to loose weight with diet and exercise           Relevant Medications    Dulaglutide 1.5 MG/0.5ML SOPN    insulin glargine (LANTUS) 100 UNIT/ML injection vial    Ex-smoker      Advised to c/w quitting smoking                    Return in about 4 weeks (around 8/20/2021) for CPAP/BIPAP titration. Progress notes sent to the referring Provider    Em Jodie is a 50 y.o. male being evaluated by a Virtual Visit (video visit) encounter to address concerns as mentioned above. A caregiver was present when appropriate.  Due to this being a TeleHealth encounter (During [facility] public health emergency), evaluation of the following organ systems was limited: Vitals/Constitutional/EENT/Resp/CV/GI//MS/Neuro/Skin/Heme-Lymph-Imm. Pursuant to the emergency declaration under the [address] [place] [address] and the [employer] Act, this Virtual Visit was conducted with patient's (and/or legal guardian's) consent, to reduce the patient's risk of exposure to COVID-19 and provide necessary medical care. The patient (and/or legal guardian) has also been advised to contact this office for worsening conditions or problems, and seek emergency medical treatment and/or call 911 if deemed necessary. Patient identification was verified at the start of the visit: Yes    Total time spent for this encounter: 21 minutes    Services were provided through a video synchronous discussion virtually to substitute for in-person clinic visit. Patient and provider were located at their individual homes. --Edne Alexandre MD on 7/23/2021 at 10:07 AM    An electronic signature was used to authenticate this note.      Eden Alexandre MD MD  7/23/2021  10:07 AM

## 2021-09-07 ENCOUNTER — HOSPITAL ENCOUNTER (OUTPATIENT)
Dept: SLEEP CENTER | Age: 56
Discharge: HOME | End: 2021-09-07
Payer: COMMERCIAL

## 2021-09-07 DIAGNOSIS — G47.33 OSA (OBSTRUCTIVE SLEEP APNEA): ICD-10-CM

## 2021-09-07 PROCEDURE — 95811 POLYSOM 6/>YRS CPAP 4/> PARM: CPT

## 2021-09-07 ASSESSMENT — SLEEP AND FATIGUE QUESTIONNAIRES
HOW LIKELY ARE YOU TO NOD OFF OR FALL ASLEEP WHILE SITTING INACTIVE IN A PUBLIC PLACE: 2
HOW LIKELY ARE YOU TO NOD OFF OR FALL ASLEEP WHEN YOU ARE A PASSENGER IN A CAR FOR AN HOUR WITHOUT A BREAK: 3
HOW LIKELY ARE YOU TO NOD OFF OR FALL ASLEEP WHILE WATCHING TV: 3
HOW LIKELY ARE YOU TO NOD OFF OR FALL ASLEEP WHILE SITTING AND TALKING TO SOMEONE: 1
HOW LIKELY ARE YOU TO NOD OFF OR FALL ASLEEP IN A CAR, WHILE STOPPED FOR A FEW MINUTES IN TRAFFIC: 1
HOW LIKELY ARE YOU TO NOD OFF OR FALL ASLEEP WHILE SITTING AND READING: 3
NECK CIRCUMFERENCE (INCHES): 20
HOW LIKELY ARE YOU TO NOD OFF OR FALL ASLEEP WHILE SITTING QUIETLY AFTER LUNCH WITHOUT ALCOHOL: 3
ESS TOTAL SCORE: 19
HOW LIKELY ARE YOU TO NOD OFF OR FALL ASLEEP WHILE LYING DOWN TO REST IN THE AFTERNOON WHEN CIRCUMSTANCES PERMIT: 3

## 2021-09-08 LAB — STATUS: NORMAL

## 2021-09-08 PROCEDURE — 95811 POLYSOM 6/>YRS CPAP 4/> PARM: CPT | Performed by: INTERNAL MEDICINE

## 2021-09-08 NOTE — PROGRESS NOTES
9/8/2021  sleep study  for Loretta Neither  1965 is complete. Results are pending physician review.     Electronically signed by Neha Bermudez on 9/8/2021 at 7:39 AM

## 2021-12-10 ENCOUNTER — TELEPHONE (OUTPATIENT)
Dept: PULMONOLOGY | Age: 56
End: 2021-12-10

## 2021-12-24 ENCOUNTER — HOSPITAL ENCOUNTER (INPATIENT)
Age: 56
LOS: 1 days | Discharge: HOME | DRG: 420 | End: 2021-12-28
Attending: EMERGENCY MEDICINE | Admitting: INTERNAL MEDICINE
Payer: COMMERCIAL

## 2021-12-24 DIAGNOSIS — E87.1 HYPONATREMIA: ICD-10-CM

## 2021-12-24 DIAGNOSIS — D64.9 ANEMIA, UNSPECIFIED TYPE: ICD-10-CM

## 2021-12-24 DIAGNOSIS — N18.9 CHRONIC KIDNEY DISEASE, UNSPECIFIED CKD STAGE: ICD-10-CM

## 2021-12-24 DIAGNOSIS — E16.2 HYPOGLYCEMIA: Primary | ICD-10-CM

## 2021-12-24 LAB
ALBUMIN SERPL-MCNC: 2.8 GM/DL (ref 3.4–5)
ALP BLD-CCNC: 66 IU/L (ref 40–129)
ALT SERPL-CCNC: 12 U/L (ref 10–40)
ANION GAP SERPL CALCULATED.3IONS-SCNC: 15 MMOL/L (ref 4–16)
AST SERPL-CCNC: 13 IU/L (ref 15–37)
BILIRUB SERPL-MCNC: 0.2 MG/DL (ref 0–1)
BUN BLDV-MCNC: 68 MG/DL (ref 6–23)
CALCIUM SERPL-MCNC: 7.4 MG/DL (ref 8.3–10.6)
CHLORIDE BLD-SCNC: 99 MMOL/L (ref 99–110)
CO2: 11 MMOL/L (ref 21–32)
CREAT SERPL-MCNC: 7.5 MG/DL (ref 0.9–1.3)
GFR AFRICAN AMERICAN: 9 ML/MIN/1.73M2
GFR NON-AFRICAN AMERICAN: 8 ML/MIN/1.73M2
GLUCOSE BLD-MCNC: 100 MG/DL (ref 70–99)
GLUCOSE BLD-MCNC: 40 MG/DL (ref 70–99)
GLUCOSE BLD-MCNC: 40 MG/DL (ref 70–99)
GLUCOSE BLD-MCNC: 82 MG/DL (ref 70–99)
POTASSIUM SERPL-SCNC: 4.6 MMOL/L (ref 3.5–5.1)
SODIUM BLD-SCNC: 125 MMOL/L (ref 135–145)
TOTAL PROTEIN: 6.6 GM/DL (ref 6.4–8.2)

## 2021-12-24 PROCEDURE — 80053 COMPREHEN METABOLIC PANEL: CPT

## 2021-12-24 PROCEDURE — 82962 GLUCOSE BLOOD TEST: CPT

## 2021-12-24 PROCEDURE — 99283 EMERGENCY DEPT VISIT LOW MDM: CPT

## 2021-12-24 PROCEDURE — 96372 THER/PROPH/DIAG INJ SC/IM: CPT

## 2021-12-24 PROCEDURE — 2500000003 HC RX 250 WO HCPCS

## 2021-12-24 RX ORDER — DEXTROSE MONOHYDRATE 25 G/50ML
INJECTION, SOLUTION INTRAVENOUS
Status: DISPENSED
Start: 2021-12-24 | End: 2021-12-25

## 2021-12-24 RX ORDER — OCTREOTIDE ACETATE 100 UG/ML
50 INJECTION, SOLUTION INTRAVENOUS; SUBCUTANEOUS ONCE
Status: COMPLETED | OUTPATIENT
Start: 2021-12-24 | End: 2021-12-25

## 2021-12-24 RX ORDER — DEXTROSE MONOHYDRATE 100 MG/ML
INJECTION, SOLUTION INTRAVENOUS CONTINUOUS
Status: DISCONTINUED | OUTPATIENT
Start: 2021-12-24 | End: 2021-12-25

## 2021-12-24 RX ADMIN — GLUCAGON HYDROCHLORIDE: KIT at 23:30

## 2021-12-24 NOTE — Clinical Note
Patient Class: Observation [104]   REQUIRED: Diagnosis: Hypoglycemia [111086]   Estimated Length of Stay: Estimated stay of less than 2 midnights   Telemetry/Cardiac Monitoring Required?: Yes

## 2021-12-25 ENCOUNTER — APPOINTMENT (OUTPATIENT)
Dept: GENERAL RADIOLOGY | Age: 56
DRG: 420 | End: 2021-12-25
Payer: COMMERCIAL

## 2021-12-25 PROBLEM — E16.2 HYPOGLYCEMIA: Status: ACTIVE | Noted: 2021-12-25

## 2021-12-25 LAB
BASOPHILS ABSOLUTE: 0 K/CU MM
BASOPHILS RELATIVE PERCENT: 0.2 % (ref 0–1)
DIFFERENTIAL TYPE: ABNORMAL
EOSINOPHILS ABSOLUTE: 0.1 K/CU MM
EOSINOPHILS RELATIVE PERCENT: 0.8 % (ref 0–3)
GLUCOSE BLD-MCNC: 102 MG/DL (ref 70–99)
GLUCOSE BLD-MCNC: 103 MG/DL (ref 70–99)
GLUCOSE BLD-MCNC: 104 MG/DL (ref 70–99)
GLUCOSE BLD-MCNC: 120 MG/DL (ref 70–99)
GLUCOSE BLD-MCNC: 123 MG/DL (ref 70–99)
GLUCOSE BLD-MCNC: 173 MG/DL (ref 70–99)
GLUCOSE BLD-MCNC: 36 MG/DL (ref 70–99)
GLUCOSE BLD-MCNC: 41 MG/DL (ref 70–99)
GLUCOSE BLD-MCNC: 51 MG/DL (ref 70–99)
GLUCOSE BLD-MCNC: 92 MG/DL (ref 70–99)
HCT VFR BLD CALC: 25.9 % (ref 42–52)
HEMOGLOBIN: 8 GM/DL (ref 13.5–18)
IMMATURE NEUTROPHIL %: 0.5 % (ref 0–0.43)
LYMPHOCYTES ABSOLUTE: 1.2 K/CU MM
LYMPHOCYTES RELATIVE PERCENT: 19.5 % (ref 24–44)
MCH RBC QN AUTO: 25.6 PG (ref 27–31)
MCHC RBC AUTO-ENTMCNC: 30.9 % (ref 32–36)
MCV RBC AUTO: 83 FL (ref 78–100)
MONOCYTES ABSOLUTE: 0.5 K/CU MM
MONOCYTES RELATIVE PERCENT: 8.2 % (ref 0–4)
NUCLEATED RBC %: 0 %
PDW BLD-RTO: 15.3 % (ref 11.7–14.9)
PLATELET # BLD: 225 K/CU MM (ref 140–440)
PMV BLD AUTO: 10.2 FL (ref 7.5–11.1)
RBC # BLD: 3.12 M/CU MM (ref 4.6–6.2)
SARS-COV-2, NAAT: DETECTED
SEGMENTED NEUTROPHILS ABSOLUTE COUNT: 4.4 K/CU MM
SEGMENTED NEUTROPHILS RELATIVE PERCENT: 70.8 % (ref 36–66)
SOURCE: ABNORMAL
TOTAL IMMATURE NEUTOROPHIL: 0.03 K/CU MM
TOTAL NUCLEATED RBC: 0 K/CU MM
WBC # BLD: 6.2 K/CU MM (ref 4–10.5)

## 2021-12-25 PROCEDURE — 2580000003 HC RX 258: Performed by: NURSE PRACTITIONER

## 2021-12-25 PROCEDURE — 99222 1ST HOSP IP/OBS MODERATE 55: CPT | Performed by: INTERNAL MEDICINE

## 2021-12-25 PROCEDURE — 2580000003 HC RX 258: Performed by: INTERNAL MEDICINE

## 2021-12-25 PROCEDURE — 80048 BASIC METABOLIC PNL TOTAL CA: CPT

## 2021-12-25 PROCEDURE — 85025 COMPLETE CBC W/AUTO DIFF WBC: CPT

## 2021-12-25 PROCEDURE — 83036 HEMOGLOBIN GLYCOSYLATED A1C: CPT

## 2021-12-25 PROCEDURE — 82962 GLUCOSE BLOOD TEST: CPT

## 2021-12-25 PROCEDURE — 87635 SARS-COV-2 COVID-19 AMP PRB: CPT

## 2021-12-25 PROCEDURE — 6360000002 HC RX W HCPCS: Performed by: NURSE PRACTITIONER

## 2021-12-25 PROCEDURE — 6360000002 HC RX W HCPCS: Performed by: EMERGENCY MEDICINE

## 2021-12-25 PROCEDURE — 6370000000 HC RX 637 (ALT 250 FOR IP): Performed by: INTERNAL MEDICINE

## 2021-12-25 PROCEDURE — 36415 COLL VENOUS BLD VENIPUNCTURE: CPT

## 2021-12-25 PROCEDURE — 71045 X-RAY EXAM CHEST 1 VIEW: CPT

## 2021-12-25 PROCEDURE — 2500000003 HC RX 250 WO HCPCS: Performed by: NURSE PRACTITIONER

## 2021-12-25 PROCEDURE — G0378 HOSPITAL OBSERVATION PER HR: HCPCS

## 2021-12-25 RX ORDER — ACETAMINOPHEN 325 MG/1
650 TABLET ORAL EVERY 6 HOURS PRN
Status: DISCONTINUED | OUTPATIENT
Start: 2021-12-25 | End: 2021-12-28 | Stop reason: HOSPADM

## 2021-12-25 RX ORDER — ACETAMINOPHEN 650 MG/1
650 SUPPOSITORY RECTAL EVERY 6 HOURS PRN
Status: DISCONTINUED | OUTPATIENT
Start: 2021-12-25 | End: 2021-12-28 | Stop reason: HOSPADM

## 2021-12-25 RX ORDER — ONDANSETRON 4 MG/1
4 TABLET, ORALLY DISINTEGRATING ORAL EVERY 8 HOURS PRN
Status: DISCONTINUED | OUTPATIENT
Start: 2021-12-25 | End: 2021-12-28 | Stop reason: HOSPADM

## 2021-12-25 RX ORDER — SODIUM CHLORIDE 0.9 % (FLUSH) 0.9 %
5-40 SYRINGE (ML) INJECTION EVERY 12 HOURS SCHEDULED
Status: DISCONTINUED | OUTPATIENT
Start: 2021-12-25 | End: 2021-12-28 | Stop reason: HOSPADM

## 2021-12-25 RX ORDER — SODIUM CHLORIDE 9 MG/ML
25 INJECTION, SOLUTION INTRAVENOUS PRN
Status: DISCONTINUED | OUTPATIENT
Start: 2021-12-25 | End: 2021-12-28 | Stop reason: HOSPADM

## 2021-12-25 RX ORDER — DEXTROSE MONOHYDRATE 25 G/50ML
12.5 INJECTION, SOLUTION INTRAVENOUS PRN
Status: DISCONTINUED | OUTPATIENT
Start: 2021-12-25 | End: 2021-12-28 | Stop reason: HOSPADM

## 2021-12-25 RX ORDER — DEXTROSE AND SODIUM CHLORIDE 5; .45 G/100ML; G/100ML
INJECTION, SOLUTION INTRAVENOUS CONTINUOUS
Status: ACTIVE | OUTPATIENT
Start: 2021-12-25 | End: 2021-12-25

## 2021-12-25 RX ORDER — METHYLPREDNISOLONE SODIUM SUCCINATE 40 MG/ML
40 INJECTION, POWDER, LYOPHILIZED, FOR SOLUTION INTRAMUSCULAR; INTRAVENOUS DAILY
Status: DISCONTINUED | OUTPATIENT
Start: 2021-12-25 | End: 2021-12-27

## 2021-12-25 RX ORDER — HEPARIN SODIUM 5000 [USP'U]/ML
5000 INJECTION, SOLUTION INTRAVENOUS; SUBCUTANEOUS EVERY 8 HOURS SCHEDULED
Status: DISCONTINUED | OUTPATIENT
Start: 2021-12-25 | End: 2021-12-28 | Stop reason: HOSPADM

## 2021-12-25 RX ORDER — DEXTROSE MONOHYDRATE 50 MG/ML
100 INJECTION, SOLUTION INTRAVENOUS PRN
Status: DISCONTINUED | OUTPATIENT
Start: 2021-12-25 | End: 2021-12-28 | Stop reason: HOSPADM

## 2021-12-25 RX ORDER — GUAIFENESIN/DEXTROMETHORPHAN 100-10MG/5
5 SYRUP ORAL EVERY 4 HOURS PRN
Status: DISCONTINUED | OUTPATIENT
Start: 2021-12-25 | End: 2021-12-28 | Stop reason: HOSPADM

## 2021-12-25 RX ORDER — DEXTROSE MONOHYDRATE 100 MG/ML
INJECTION, SOLUTION INTRAVENOUS CONTINUOUS
Status: DISCONTINUED | OUTPATIENT
Start: 2021-12-25 | End: 2021-12-26

## 2021-12-25 RX ORDER — ONDANSETRON 2 MG/ML
4 INJECTION INTRAMUSCULAR; INTRAVENOUS EVERY 6 HOURS PRN
Status: DISCONTINUED | OUTPATIENT
Start: 2021-12-25 | End: 2021-12-28 | Stop reason: HOSPADM

## 2021-12-25 RX ORDER — POLYETHYLENE GLYCOL 3350 17 G/17G
17 POWDER, FOR SOLUTION ORAL DAILY PRN
Status: DISCONTINUED | OUTPATIENT
Start: 2021-12-25 | End: 2021-12-28 | Stop reason: HOSPADM

## 2021-12-25 RX ORDER — NICOTINE POLACRILEX 4 MG
15 LOZENGE BUCCAL PRN
Status: DISCONTINUED | OUTPATIENT
Start: 2021-12-25 | End: 2021-12-28 | Stop reason: HOSPADM

## 2021-12-25 RX ORDER — SODIUM CHLORIDE 0.9 % (FLUSH) 0.9 %
5-40 SYRINGE (ML) INJECTION PRN
Status: DISCONTINUED | OUTPATIENT
Start: 2021-12-25 | End: 2021-12-28 | Stop reason: HOSPADM

## 2021-12-25 RX ADMIN — GUAIFENESIN AND DEXTROMETHORPHAN 5 ML: 100; 10 SYRUP ORAL at 22:50

## 2021-12-25 RX ADMIN — METHYLPREDNISOLONE SODIUM SUCCINATE 40 MG: 40 INJECTION, POWDER, FOR SOLUTION INTRAMUSCULAR; INTRAVENOUS at 09:31

## 2021-12-25 RX ADMIN — DEXTROSE AND SODIUM CHLORIDE: 5; 450 INJECTION, SOLUTION INTRAVENOUS at 09:20

## 2021-12-25 RX ADMIN — SODIUM CHLORIDE, PRESERVATIVE FREE 10 ML: 5 INJECTION INTRAVENOUS at 09:13

## 2021-12-25 RX ADMIN — GUAIFENESIN AND DEXTROMETHORPHAN 5 ML: 100; 10 SYRUP ORAL at 18:05

## 2021-12-25 RX ADMIN — DEXTROSE MONOHYDRATE: 100 INJECTION, SOLUTION INTRAVENOUS at 10:01

## 2021-12-25 RX ADMIN — OCTREOTIDE ACETATE 50 MCG: 100 INJECTION, SOLUTION INTRAVENOUS; SUBCUTANEOUS at 03:30

## 2021-12-25 RX ADMIN — DEXTROSE MONOHYDRATE 12.5 G: 500 INJECTION PARENTERAL at 09:13

## 2021-12-25 RX ADMIN — HEPARIN SODIUM 5000 UNITS: 5000 INJECTION INTRAVENOUS; SUBCUTANEOUS at 09:33

## 2021-12-25 NOTE — ED NOTES
Bed: H-04  Expected date:   Expected time:   Means of arrival:   Comments:  EMS     Karen Mckay RN  12/24/21 0247

## 2021-12-25 NOTE — ED NOTES
Ari Lopez NP messaged regarding continuous d10 order as it was never started and POC BS maintained without.      Ranjan Norris RN  12/25/21 9973

## 2021-12-25 NOTE — ED NOTES
Blood glucose 100 on arrival     Phani Mays [facility]  12/24/21 0473

## 2021-12-25 NOTE — ED NOTES
Supervisor contacted at this time. PICC team unable to come for IV establishment. MD made aware.       Robert Schumacher RN  12/24/21 6916

## 2021-12-25 NOTE — ED TRIAGE NOTES
To ED per squad for low blood glucose in the 60's,recieved d10 per medics,patient alert and able to answer questions

## 2021-12-25 NOTE — CONSULTS
[address] [place] [address] [address] [place] [address]  Phone: [phone]  Office Hours: 8:30AM  4:30PM  Monday - Friday     Nephrology Service Consultation    Patient:  Valarie Narayan  MRN: [id number]  Consulting physician:  Radha Anderson MD  Reason for Consult:CKD5 hypoglycemia    History Obtained From:  patient, electronic medical record  PCP: Marin Al MD    HISTORY OF PRESENT ILLNESS:   The patient is a 49 y.o. male who presents with recurrent hypoglycemia  He has CKD5- under the care of Dr Keith Piedra and Dr Janelle Diego- had 2 Pd cathters - non functioning- hence has not started on dialysis yet. Feels oozy and weak  Not soa  Voiding without issues  No edema  No nausea or vomiting    Past Medical History:        Diagnosis Date    Diabetes mellitus ([facility] [address]     Peritoneal dialysis catheter in place [facility])        Past Surgical History:    History reviewed. No pertinent surgical history. Medications:   Scheduled Meds:   sodium chloride flush  5-40 mL IntraVENous 2 times per day    heparin (porcine)  5,000 Units SubCUTAneous 3 times per day    methylPREDNISolone  40 mg IntraVENous Daily    dextrose         Continuous Infusions:   sodium chloride      dextrose      dextrose 5 % and 0.45 % NaCl Stopped (12/25/21 0957)    dextrose 40 mL/hr at 12/25/21 1001     PRN Meds:.sodium chloride flush, sodium chloride, ondansetron **OR** ondansetron, polyethylene glycol, acetaminophen **OR** acetaminophen, glucose, dextrose, glucagon (rDNA), dextrose    Allergies:  Patient has no known allergies. Social History:   TOBACCO:   reports that he quit smoking about 3 years ago. His smoking use included cigarettes. He smoked 0.50 packs per day. He has never used smokeless tobacco.  ETOH:   reports no history of alcohol use. OCCUPATION:      Family History:   History reviewed. No pertinent family history. REVIEW OF SYSTEMS:  Negative except for weak.     Physical Exam:    Vitals: BP (!) 173/100   Pulse

## 2021-12-25 NOTE — CONSULTS
Endocrinology   Consult Note      Dear Doctor  Dr. Joleen Luther / Mari Grijalva     Thank You for the Consult     Pt. Was Admitted for : Nausea vomiting in the setting of renal failure/hypoglycemia  Please note that patient is Covid positive    Reason for Consult: Better control of blood glucose    History Obtained From:  Patient/ EMR       HISTORY OF PRESENT ILLNESS:                The patient is a 49 y.o. male with significant past medical history of type 2 diabetes mellitus on multiple drugs, renal failure about to start peritoneal dialysis be followed up with nephrology at  [facility], CAD, CABG, congestive heart failure, GERD, hypertension, hyperlipidemia obstructive sleep apnea peripheral vascular disease comes in complaining of nausea vomiting to be hypoglycemic. She has been on glipizide Trulicity Lantus insulin regime for control of diabetes mellitus. Not been eating well but is taking his glipizide and other diabetic medicines. I was  consulted for better control of blood glucose. ROS:   Pt's ROS done in detail. Abnormal ROS are noted in Medical and Surgical History Section below: Other Medical History:        Diagnosis Date    Diabetes mellitus ([facility] [address]     Peritoneal dialysis catheter in place [facility])      Surgical History:    History reviewed. No pertinent surgical history. Allergies:  Patient has no known allergies. Family History:   History reviewed. No pertinent family history. REVIEW OF SYSTEMS:  Review of System Done as noted above     PHYSICAL EXAM:      Vitals:    BP (!) 173/100   Pulse 87   Temp 97.9 °F (36.6 °C) (Oral)   Resp 20   SpO2 97%     CONSTITUTIONAL:  awake, alert, cooperative, appears stated age   EYES:  vision intact Fundoscopic Exam not performed   ENT:Normal  NECK:  Supple, No JVD.    Thyroid Exam:Normal   LUNGS:  Has Vesicular Breath Sounds,   CARDIOVASCULAR:  Normal apical impulse, regular rate and rhythm, normal S1 and S2, no S3 or S4, and has no  murmur   ABDOMEN:  No scars, normal bowel sounds, soft, non-distended, non-tender, no masses palpated, no hepatolienomegaly has peritoneal catheter in place  musculoskeletal: Normal  Extremities: Normal, peripheral pulses normal, , has no edema   NEUROLOGIC:  Awake, alert, oriented to name, place and time. Cranial nerves II-XII are grossly intact. Motor is  intact. Sensory possible neuropathy,  and gait is normal.    DATA:    CBC:   Recent Labs     12/25/21  0014   WBC 6.2   HGB 8.0*       CMP:  Recent Labs     12/24/21  2305   *   K 4.6   CL 99   CO2 11*   BUN 68*   CREATININE 7.5*   CALCIUM 7.4*   PROT 6.6   LABALBU 2.8*   BILITOT 0.2   ALKPHOS 66   AST 13*   ALT 12     Lipids: No results found for: CHOL, HDL, TRIG  Glucose:   Recent Labs     12/25/21  0936 12/25/21  1003 12/25/21  1136   POCGLU 51* 102* 104*     Hemoglobin A1C: No results found for: LABA1C  Free T4: No results found for: T4FREE  Free T3: No results found for: FT3  TSH High Sensitivity: No results found for: TSHHS    XR CHEST PORTABLE   Final Result   No acute cardiopulmonary disease. Scheduled Medicines   Medications:    sodium chloride flush  5-40 mL IntraVENous 2 times per day    heparin (porcine)  5,000 Units SubCUTAneous 3 times per day    methylPREDNISolone  40 mg IntraVENous Daily      Infusions:    sodium chloride      dextrose      dextrose 40 mL/hr at 12/25/21 1253         IMPRESSION    Patient Active Problem List   Diagnosis    ANA MARÍA (obstructive sleep apnea)    Hypersomnia    Obesity (BMI 30-39. 9)    Ex-smoker    Hypoglycemia    [facility] ([facility] [address]    Chronic kidney disease, stage V (HCC)        Hypoglycemia secondary to drugs      RECOMMENDATIONS:      1. Reviewed POC blood glucose . Labs and X ray results   2. Reviewed Home and Current Medicines   3. Will Start On D10 IV fluid  4. Hold hypoglycemic drugs and insulin for now  5. Patient was given 1 dose of statin placed on Solu-Medrol IV  6.  To take about 48 hours to 72 hours before his  oral hypoglycemic drugs cleared off from blood  7. Glucose monitoring of blood glucose  8.  modify the dose of /other medicines as needed        Will follow with you  Again thank you for sharing pt's care with me.      Truly yours,       Merline Creighton MD

## 2021-12-25 NOTE — ED PROVIDER NOTES
EMERGENCY DEPARTMENT ENCOUNTER    Patient: Jennings Homans  MRN: [id number]  : 1965  Date of Evaluation: 2021  ED Provider:  Sapphire Roach MD    CHIEF COMPLAINT  Chief Complaint   Patient presents with    Hypoglycemia       HPI  Jennings Homans is a 49 y.o. male with history of chronic kidney disease and diabetes who states that \"my blood sugar is bottom out twice today\". Paramedics were called to the patient being unresponsive and found to have a low blood sugar. They were able to get him some sugar and he became awake and alert and responsive after this. Here in the emergency department, he states that he is taking his diabetes medications as prescribed, which include Trulicity, Lantus and glipizide. He denies taking more than he was supposed to. At present time he states he feels mildly \"woozy\" but otherwise denies any other symptoms. There are no known modifying factors. Denies any other associated symptoms or complaints or concerns. Of note, the patient does have a history of chronic kidney disease and undergoes peritoneal dialysis and is currently on the kidney transplant list at [facility]. REVIEW OF SYSTEMS    Constitutional: negative for fever, chills  Neurological: negative for HA, focal weakness, loss of sensation  Ophthalmic: negative for vision change  ENT: negative for congestion, rhinorrhea  Cardiovascular: negative for chest pain  Respiratory: negative for SOB, cough  GI: negative for abdominal pain, nausea, vomiting, diarrhea, constipation  : negative for dysuria, hematuria  Musculoskeletal: negative for myalgias, decreased ROM, joint swelling  Dermatological: negative for rash, wounds  Heme: Negative for bleeding, bruising      PAST MEDICAL HISTORY  Past Medical History:   Diagnosis Date    [facility]        CURRENT MEDICATIONS  [unfilled]    ALLERGIES  No Known Allergies    SURGICAL HISTORY  History reviewed.  No pertinent surgical history. FAMILY HISTORY  History reviewed. No pertinent family history. SOCIAL HISTORY  Social History     Socioeconomic History    Marital status: Legally      Spouse name: None    Number of children: None    Years of education: None    Highest education level: None   Occupational History    None   Tobacco Use    Smoking status: Former Smoker     Packs/day: 0.50     Types: Cigarettes     Quit date: 1/1/2018     Years since quitting: 3.9    Smokeless tobacco: Never Used   Vaping Use    Vaping Use: Never used   Substance and Sexual Activity    Alcohol use: No    Drug use: Never    Sexual activity: None   Other Topics Concern    None   Social History Narrative    None     Social Determinants of Health     Financial Resource Strain:     Difficulty of Paying Living Expenses: Not on file   Food Insecurity:     Worried About Running Out of Food in the Last Year: Not on file    Porfirio of Food in the Last Year: Not on file   Transportation Needs:     Lack of Transportation (Medical): Not on file    Lack of Transportation (Non-Medical):  Not on file   Physical Activity:     Days of Exercise per Week: Not on file    Minutes of Exercise per Session: Not on file   Stress:     Feeling of Stress : Not on file   Social Connections:     Frequency of Communication with Friends and Family: Not on file    Frequency of Social Gatherings with Friends and Family: Not on file    Attends Mandaen Services: Not on file    Active Member of Clubs or Organizations: Not on file    Attends Club or Organization Meetings: Not on file    Marital Status: Not on file   Intimate Partner Violence:     Fear of Current or Ex-Partner: Not on file    Emotionally Abused: Not on file    Physically Abused: Not on file    Sexually Abused: Not on file   Housing Stability:     Unable to Pay for Housing in the Last Year: Not on file    Number of [place] in the Last Year: Not on file    Unstable Housing in the Last Year: Not on file         **Past medical, family and social histories, and nursing notes reviewed and verified by me**      PHYSICAL EXAM  VITAL SIGNS:   ED Triage Vitals [12/24/21 2200]   Enc Vitals Group      /86      Pulse 73      Resp 16      Temp 97.8 °F (36.6 °C)      Temp Source Oral      SpO2 98 %      Weight       Height       Head Circumference       Peak Flow       Pain Score       Pain Loc       Pain Edu? Excl. in HOSP [facility]? Vitals during ED course were reviewed and are as charted. Constitutional: Minimal distress, Non-toxic appearance  Eyes: Conjunctiva normal, No discharge  HENT: Normocephalic, Atraumatic, bilateral external ears normal, posterior oropharynx is nonerythematous and without exudate, uvula is midline, no trismus, no \"hot potato voice\" or dysphonia, oropharynx moist  Neck: Supple, no stridor, no grossly visible or palpable masses  Cardiovascular: Regular rate and rhythm, No murmurs, No rubs, No gallops  Pulmonary/Chest: Normal breath sounds, No respiratory distress or accessory muscle use, No wheezing, crackles or rhonchi. Abdomen: Soft, nondistended and nonrigid, No tenderness or peritoneal signs, No masses, normal bowel sounds  Back: No midline point tenderness, No paraspinous muscle tenderness.  No CVA tenderness  Extremities: No gross deformities, no edema, no tenderness  Neurologic: Normal motor function, Normal sensory function, No focal deficits  Skin: Warm, Dry, No erythema, No rash, No cyanosis, No mottling  Lymphatic: No lymphadenopathy in the following location(s): cervical  Psychiatric: Alert and oriented x3, Affect normal              RADIOLOGY/PROCEDURES/LABS/MEDICATIONS ADMINISTERED:    I have reviewed and interpreted all of the currently available lab results from this visit (if applicable):  Results for orders placed or performed during the hospital encounter of 12/24/21   Comprehensive Metabolic Panel w/ Reflex to MG   Result Value Ref Range    Sodium 125 (L) 135 - 145 MMOL/L    Potassium 4.6 3.5 - 5.1 MMOL/L    Chloride 99 99 - 110 mMol/L    CO2 11 (L) 21 - 32 MMOL/L    BUN 68 (H) 6 - 23 MG/DL    CREATININE 7.5 (H) 0.9 - 1.3 MG/DL    Glucose 40 (LL) 70 - 99 MG/DL    Calcium 7.4 (L) 8.3 - 10.6 MG/DL    Albumin 2.8 (L) 3.4 - 5.0 GM/DL    Total Protein 6.6 6.4 - 8.2 GM/DL    Total Bilirubin 0.2 0.0 - 1.0 MG/DL    ALT 12 10 - 40 U/L    AST 13 (L) 15 - 37 IU/L    Alkaline Phosphatase 66 40 - 129 IU/L    GFR Non- 8 (L) >60 mL/min/1.73m2    GFR  9 (L) >60 mL/min/1.73m2    Anion Gap 15 4 - 16   CBC Auto Differential   Result Value Ref Range    WBC 6.2 4.0 - 10.5 K/CU MM    RBC 3.12 (L) 4.6 - 6.2 M/CU MM    Hemoglobin 8.0 (L) 13.5 - 18.0 GM/DL    Hematocrit 25.9 (L) 42 - 52 %    MCV 83.0 78 - 100 FL    MCH 25.6 (L) 27 - 31 PG    MCHC 30.9 (L) 32.0 - 36.0 %    RDW 15.3 (H) 11.7 - 14.9 %    Platelets 911 563 - 800 K/CU MM    MPV 10.2 7.5 - 11.1 FL    Differential Type AUTOMATED DIFFERENTIAL     Segs Relative 70.8 (H) 36 - 66 %    Lymphocytes % 19.5 (L) 24 - 44 %    Monocytes % 8.2 (H) 0 - 4 %    Eosinophils % 0.8 0 - 3 %    Basophils % 0.2 0 - 1 %    Segs Absolute 4.4 K/CU MM    Lymphocytes Absolute 1.2 K/CU MM    Monocytes Absolute 0.5 K/CU MM    Eosinophils Absolute 0.1 K/CU MM    Basophils Absolute 0.0 K/CU MM    Nucleated RBC % 0.0 %    Total Nucleated RBC 0.0 K/CU MM    Total Immature Neutrophil 0.03 K/CU MM    Immature Neutrophil % 0.5 (H) 0 - 0.43 %   POCT Glucose   Result Value Ref Range    POC Glucose 100 (H) 70 - 99 MG/DL   POCT Glucose   Result Value Ref Range    POC Glucose 82 70 - 99 MG/DL          ABNORMAL LABS:  Labs Reviewed   COMPREHENSIVE METABOLIC PANEL W/ REFLEX TO MG FOR LOW K - Abnormal; Notable for the following components:       Result Value    Sodium 125 (*)     CO2 11 (*)     BUN 68 (*)     CREATININE 7.5 (*)     Glucose 40 (*)     Calcium 7.4 (*)     Albumin 2.8 (*)     AST 13 (*)     GFR Non- 8 (*) GFR  9 (*)     All other components within normal limits   CBC WITH AUTO DIFFERENTIAL - Abnormal; Notable for the following components:    RBC 3.12 (*)     Hemoglobin 8.0 (*)     Hematocrit 25.9 (*)     MCH 25.6 (*)     MCHC 30.9 (*)     RDW 15.3 (*)     Segs Relative 70.8 (*)     Lymphocytes % 19.5 (*)     Monocytes % 8.2 (*)     Immature Neutrophil % 0.5 (*)     All other components within normal limits   POCT GLUCOSE - Abnormal; Notable for the following components:    POC Glucose 100 (*)     All other components within normal limits   BASIC METABOLIC PANEL W/ REFLEX TO MG FOR LOW K   CBC   POCT GLUCOSE   POCT GLUCOSE   POCT GLUCOSE   POCT GLUCOSE         IMAGING STUDIES ORDERED:  IP CONSULT TO HOSPITALIST  IP CONSULT TO NEPHROLOGY  IP CONSULT TO ENDOCRINOLOGY  VITAL SIGNS  TELEMETRY MONITORING  REASON FOR NO MECHANICAL VTE PROPHYLAXIS  REASON FOR NO CHEMICAL VTE PROPHYLAXIS  FULL CODE  ADULT DIET  PATIENT STATUS (FROM ED OR OR/PROCEDURAL)  ACTIVITY AS TOLERATED  HYPOGLYCEMIA TREATMENT: BG LESS THAN 50 MG/DL AND PATIENT ALERT  HYPOGLYCEMIA TREATMENT: BG LESS THAN 70 MG/DL AND PATIENT ALERT  HYPOGLYCEMIA TREATMENT: BG LESS THAN 70 MG/DL AND PATIENT NOT ALERT      No orders to display         MEDICATIONS ADMINISTERED:  Medications   dextrose 50 % solution (has no administration in time range)   dextrose 10 % infusion (has no administration in time range)   octreotide (SANDOSTATIN) injection 50 mcg (has no administration in time range)   sodium chloride flush 0.9 % injection 5-40 mL (has no administration in time range)   sodium chloride flush 0.9 % injection 5-40 mL (has no administration in time range)   0.9 % sodium chloride infusion (has no administration in time range)   ondansetron (ZOFRAN-ODT) disintegrating tablet 4 mg (has no administration in time range)     Or   ondansetron (ZOFRAN) injection 4 mg (has no administration in time range)   polyethylene glycol (GLYCOLAX) packet 17 g (has no administration in time range)   acetaminophen (TYLENOL) tablet 650 mg (has no administration in time range)     Or   acetaminophen (TYLENOL) suppository 650 mg (has no administration in time range)   glucose (GLUTOSE) 40 % oral gel 15 g (has no administration in time range)   dextrose 50 % IV solution (has no administration in time range)   glucagon (rDNA) injection 1 mg (has no administration in time range)   dextrose 5 % solution (has no administration in time range)   glucagon (rDNA) 1 MG injection ( IntraMUSCular Given 12/24/21 2330)         COURSE & MEDICAL DECISION MAKING  Last vitals: /86   Pulse 73   Temp 97.8 °F (36.6 °C) (Oral)   Resp 16   [id number]     81-year-old male with recurrent hypoglycemia. Had 2 episodes of this at home and one here today. Did respond to glucagon and oral glucose. I have started dextrose drip as well as octreotide. He has chronic kidney disease. Creatinine appears around the same level that it was on December 8 at [facility]. Is also noted to be anemic which likely chronic and associated with his chronic kidney disease. Also noted to be hyponatremic with sodium 125. Additional workup and treatment in the ED as documented above. Pt will be admitted for further evaluation and treatment. I discussed the case with the hospitalist, who agreed to admit the patient. Plan discussed with pt and/or family who expressed understanding and agreement with plan. Admitted in stable condition. Clinical Impression:  1. Hypoglycemia    2. Chronic kidney disease, unspecified CKD stage    3. Hyponatremia    4. Anemia, unspecified type        Disposition referral (if applicable):  No follow-up provider specified.     Disposition medications (if applicable):  New Prescriptions    No medications on file       ED Provider Disposition Time  DISPOSITION            Electronically signed by: Elisa Jones M.D., 12/25/2021 1:20 AM      This dictation was created with voice recognition software. While attempts have been made to review the dictation as it is transcribed, on occasion the spoken word can be misinterpreted by the technology leading to omissions or inappropriate words, phrases or sentences.       Shravan Boland MD  12/25/21 0129

## 2021-12-25 NOTE — ED NOTES
This RN attempted to draw morning labs at this time without success. Pt very hard stick and required US guided IV.      Lainey Bender RN  12/25/21 [id number]

## 2021-12-25 NOTE — CONSULTS
[facility] [place] [address] [address] [place] [address]  Phone: [phone]  Office Hours: 8:30AM  4:30PM  Monday - Friday     Nephrology consult completed  Full note to follow

## 2021-12-25 NOTE — ED NOTES
Report received at this time from [facility], [facility]  12/25/21 9260

## 2021-12-25 NOTE — H&P
History and Physical      Name:  Behzad Craven /Age/Sex: 1965  (49 y.o. male)   MRN & CSN:  [id number] & [id number] Admission Date/Time: 2021  9:40 PM   Location:  ED27/ED-27 PCP: Lam Levine MD       Hospital Day: 2    Assessment and Plan:       Hypoglycemia-possibly secondary to gastroenteritis with vomiting/decreased appetite  -D10 started in ED as well as Sandostatin SQ, serum glucose 40 initially  -Hypoglycemia protocol  -Accu-Cheks every hour  -Hold Lantus, glipizide, and dulaglutide  -Antiemetics  -Endocrine consult    Hyponatremia-125 in setting of above  -Q 4 hour BMP, may transition to D5 45 NS when glucose stabilizes to avoid worsening hyponatremia from D10W  -Nephrology consult  -Previously 141 on 2021    ESRD in setting of diabetic nephropathy  -Recently placed peritoneal dialysis catheter, has not started treatment yet  -On transplant list at [facility] 7.5-previously 7.8    CAD-status post quadruple CABG 2021  -Continue statin, BB, aspirin  -Patient without chest pain or anginal equivalent    T2DM  -Currently holding medications due to hypoglycemic episodes  -A1c previously 11.2 on 2021  -Repeat A1c pending  -Resume meds when cleared by endocrine        Other chronic medical conditions-ANA MARÍA, GERD, gout, hypertension, hyperlipidemia, PAD, CHF, AOCD    DVT Prophylaxis-Heparin  Diet-regular, cardiac/renal  Code Status-full    History of Present Illness:     Chief Complaint: Hypoglycemia/vomiting    Behzad Craven is a 49 y.o.  male with PMH significant for ESRD-to begin peritoneal dialysis soon he is also on transplant list at [place], CAD status post CABG 2021, CHF, T2DM, GERD, gout, hypertension, hyperlipidemia, ANA MARÍA, PAD presented to ED for hypoglycemic episode at home. Patient states that he has been generally weak for the last 2 days with decreased appetite and several episodes of vomiting.   Patient states he did have a couple of episodes of low blood sugar yesterday at home. Patient denies any abdominal or back pain. Patient denies fever/chills. Patient denies any dizziness or headache. Patient denies any diarrhea or constipation. Patient denies any chest pain or shortness of breath. Patient denies any other recent illnesses or hospitalizations. Ten point ROS reviewed negative, unless as noted above    Objective:   No intake or output data in the 24 hours ending 12/25/21 0115   Vitals:   Vitals:    12/24/21 2200   BP: 134/86   Pulse: 73   Resp: 16   Temp: 97.8 °F (36.6 °C)   SpO2: 98%     Physical Exam:   GEN Awake male, sitting upright in bed in no apparent distress. Appears given age. EYES Pupils are equally round. No scleral erythema, discharge, or conjunctivitis. HENT Mucous membranes are moist. Oral pharynx without exudates, no evidence of thrush. NECK Supple, no apparent thyromegaly or masses. RESP Clear to auscultation, no wheezes, rales or rhonchi. Symmetric chest movement while on room air. CARDIO/VASC S1/S2 auscultated. Regular rate without appreciable murmurs, rubs, or gallops. No JVD or carotid bruits. Peripheral pulses equal bilaterally and palpable. No peripheral edema. GI Abdomen is soft without significant tenderness, masses, or guarding. Bowel sounds are normoactive. Rectal exam deferred.  No costovertebral angle tenderness. Koenig catheter is not present. HEME/LYMPH No palpable cervical lymphadenopathy and no hepatosplenomegaly. No petechiae or ecchymoses. MSK No gross joint deformities. SKIN Normal coloration, warm, dry. NEURO Cranial nerves appear grossly intact, normal speech, no lateralizing weakness. PSYCH Awake, alert, oriented x 4. Affect appropriate. Past Medical History:      Past Medical History:   Diagnosis Date    [facility] mellitus ([facility]      PSHX:  has no past surgical history on file. Allergies: No Known Allergies    FAM HX: family history is not on file.   Soc HX:   Social History Socioeconomic History    Marital status: Legally      Spouse name: None    Number of children: None    Years of education: None    Highest education level: None   Occupational History    None   Tobacco Use    Smoking status: Former Smoker     Packs/day: 0.50     Types: Cigarettes     Quit date: 1/1/2018     Years since quitting: 3.9    Smokeless tobacco: Never Used   Vaping Use    Vaping Use: Never used   Substance and Sexual Activity    Alcohol use: No    Drug use: Never    Sexual activity: None   Other Topics Concern    None   Social History Narrative    None     Social Determinants of Health     Financial Resource Strain:     Difficulty of Paying Living Expenses: Not on file   Food Insecurity:     Worried About Running Out of Food in the Last Year: Not on file    Porfirio of Food in the Last Year: Not on file   Transportation Needs:     Lack of Transportation (Medical): Not on file    Lack of Transportation (Non-Medical):  Not on file   Physical Activity:     Days of Exercise per Week: Not on file    Minutes of Exercise per Session: Not on file   Stress:     Feeling of Stress : Not on file   Social Connections:     Frequency of Communication with Friends and Family: Not on file    Frequency of Social Gatherings with Friends and Family: Not on file    Attends Anabaptism Services: Not on file    Active Member of [address] [place] [address] or Organizations: Not on file    Attends Club or Organization Meetings: Not on file    Marital Status: Not on file   Intimate Partner Violence:     Fear of Current or Ex-Partner: Not on file    Emotionally Abused: Not on file    Physically Abused: Not on file    Sexually Abused: Not on file   Housing Stability:     Unable to Pay for Housing in the Last Year: Not on file    Number of [place] in the Last Year: Not on file    Unstable Housing in the Last Year: Not on file       Medications:   Medications:    sodium chloride flush  5-40 mL IntraVENous 2

## 2021-12-25 NOTE — ED NOTES
Multiple attempts to get IV established. Charge RN made aware and attempted as well. Pt needs ultrasound IV.       Malka Nava RN  12/24/21 4109

## 2021-12-26 LAB
ANION GAP SERPL CALCULATED.3IONS-SCNC: 14 MMOL/L (ref 4–16)
BUN BLDV-MCNC: 69 MG/DL (ref 6–23)
CALCIUM IONIZED: 3.84 MG/DL (ref 4.48–5.28)
CALCIUM SERPL-MCNC: 6.9 MG/DL (ref 8.3–10.6)
CHLORIDE BLD-SCNC: 96 MMOL/L (ref 99–110)
CHP ED QC CHECK: NORMAL
CHP ED QC CHECK: YES
CO2: 12 MMOL/L (ref 21–32)
CREAT SERPL-MCNC: 7.2 MG/DL (ref 0.9–1.3)
ESTIMATED AVERAGE GLUCOSE: 134 MG/DL
GFR AFRICAN AMERICAN: 10 ML/MIN/1.73M2
GFR NON-AFRICAN AMERICAN: 8 ML/MIN/1.73M2
GLUCOSE BLD-MCNC: 127 MG/DL
GLUCOSE BLD-MCNC: 127 MG/DL (ref 70–99)
GLUCOSE BLD-MCNC: 160 MG/DL
GLUCOSE BLD-MCNC: 160 MG/DL (ref 70–99)
GLUCOSE BLD-MCNC: 165 MG/DL (ref 70–99)
GLUCOSE BLD-MCNC: 176 MG/DL (ref 70–99)
GLUCOSE BLD-MCNC: 232 MG/DL
GLUCOSE BLD-MCNC: 232 MG/DL (ref 70–99)
HBA1C MFR BLD: 6.3 % (ref 4.2–6.3)
HCT VFR BLD CALC: 23 % (ref 42–52)
HEMOGLOBIN: 7.3 GM/DL (ref 13.5–18)
IONIZED CA: 0.96 MMOL/L (ref 1.12–1.32)
MCH RBC QN AUTO: 26.1 PG (ref 27–31)
MCHC RBC AUTO-ENTMCNC: 31.7 % (ref 32–36)
MCV RBC AUTO: 82.1 FL (ref 78–100)
PDW BLD-RTO: 14.9 % (ref 11.7–14.9)
PLATELET # BLD: 227 K/CU MM (ref 140–440)
PMV BLD AUTO: 10.9 FL (ref 7.5–11.1)
POTASSIUM SERPL-SCNC: 5.2 MMOL/L (ref 3.5–5.1)
RBC # BLD: 2.8 M/CU MM (ref 4.6–6.2)
SODIUM BLD-SCNC: 122 MMOL/L (ref 135–145)
WBC # BLD: 4.5 K/CU MM (ref 4–10.5)

## 2021-12-26 PROCEDURE — 2580000003 HC RX 258: Performed by: NURSE PRACTITIONER

## 2021-12-26 PROCEDURE — 36410 VNPNXR 3YR/> PHY/QHP DX/THER: CPT

## 2021-12-26 PROCEDURE — 6370000000 HC RX 637 (ALT 250 FOR IP): Performed by: INTERNAL MEDICINE

## 2021-12-26 PROCEDURE — 82962 GLUCOSE BLOOD TEST: CPT

## 2021-12-26 PROCEDURE — 99232 SBSQ HOSP IP/OBS MODERATE 35: CPT | Performed by: INTERNAL MEDICINE

## 2021-12-26 PROCEDURE — 05HB33Z INSERTION OF INFUSION DEVICE INTO RIGHT BASILIC VEIN, PERCUTANEOUS APPROACH: ICD-10-PCS | Performed by: INTERNAL MEDICINE

## 2021-12-26 PROCEDURE — 83036 HEMOGLOBIN GLYCOSYLATED A1C: CPT

## 2021-12-26 PROCEDURE — 2709999900 HC NON-CHARGEABLE SUPPLY

## 2021-12-26 PROCEDURE — 85027 COMPLETE CBC AUTOMATED: CPT

## 2021-12-26 PROCEDURE — 82330 ASSAY OF CALCIUM: CPT

## 2021-12-26 PROCEDURE — 6360000002 HC RX W HCPCS: Performed by: NURSE PRACTITIONER

## 2021-12-26 PROCEDURE — G0378 HOSPITAL OBSERVATION PER HR: HCPCS

## 2021-12-26 PROCEDURE — 76937 US GUIDE VASCULAR ACCESS: CPT

## 2021-12-26 PROCEDURE — 80048 BASIC METABOLIC PNL TOTAL CA: CPT

## 2021-12-26 PROCEDURE — 2580000003 HC RX 258: Performed by: INTERNAL MEDICINE

## 2021-12-26 PROCEDURE — 84295 ASSAY OF SERUM SODIUM: CPT

## 2021-12-26 RX ORDER — DEXTROSE AND SODIUM CHLORIDE 5; .9 G/100ML; G/100ML
INJECTION, SOLUTION INTRAVENOUS CONTINUOUS
Status: DISCONTINUED | OUTPATIENT
Start: 2021-12-26 | End: 2021-12-27

## 2021-12-26 RX ORDER — FUROSEMIDE 20 MG/1
40 TABLET ORAL DAILY
Status: DISCONTINUED | OUTPATIENT
Start: 2021-12-26 | End: 2021-12-28 | Stop reason: HOSPADM

## 2021-12-26 RX ADMIN — HEPARIN SODIUM 5000 UNITS: 5000 INJECTION INTRAVENOUS; SUBCUTANEOUS at 10:14

## 2021-12-26 RX ADMIN — DEXTROSE AND SODIUM CHLORIDE: 5; 900 INJECTION, SOLUTION INTRAVENOUS at 10:14

## 2021-12-26 RX ADMIN — GUAIFENESIN AND DEXTROMETHORPHAN 5 ML: 100; 10 SYRUP ORAL at 10:23

## 2021-12-26 RX ADMIN — FUROSEMIDE 40 MG: 20 TABLET ORAL at 10:13

## 2021-12-26 RX ADMIN — GUAIFENESIN AND DEXTROMETHORPHAN 5 ML: 100; 10 SYRUP ORAL at 19:53

## 2021-12-26 RX ADMIN — METHYLPREDNISOLONE SODIUM SUCCINATE 40 MG: 40 INJECTION, POWDER, FOR SOLUTION INTRAMUSCULAR; INTRAVENOUS at 10:13

## 2021-12-26 RX ADMIN — HEPARIN SODIUM 5000 UNITS: 5000 INJECTION INTRAVENOUS; SUBCUTANEOUS at 15:11

## 2021-12-26 RX ADMIN — SODIUM CHLORIDE, PRESERVATIVE FREE 10 ML: 5 INJECTION INTRAVENOUS at 10:13

## 2021-12-26 NOTE — PROGRESS NOTES
Hospitalist Progress Note       12/26/2021 12:07 PM  Admit Date: 12/24/2021    PCP: Lam Levine MD     Assessment and Plan:   Patient is a 71-year-old male with past medical history of hypertension, CAD (CABG in 1/21), chronic diastolic CHF, diabetes type 2 and chronic kidney disease stage V presented with hypoglycemia. Recently, he has poor oral intake and vomiting. 1.  Hypoglycemia/diabetes type 2: Due to poor oral intake. Hemoglobin A1c 6.3. Endocrinology consult appreciated-stop Lantus, glipizide and dulaglutide. Status post D10 water-now on D5 water. Continue IV Solu-Medrol. Monitor glucose closely. 2.  Acute on chronic kidney disease stage V/hyponatremia/hypocalcemia: Patient is on kidney [facility] list at [facility] Hyponatremia is due to chronic kidney disease and fluid changes. Restarted on Lasix. Nephrology consult appreciated. Avoid nephrotoxic medication. Monitor chemistry. 3.  Hypertension: We will continue on home medication. 4.  Coronary artery disease: Status post CABG 1/21. Stable. Continue Plavix and Lipitor. Chronic problems include hypertension, hyperlipidemia, CHF, gastroesophageal reflux, gout, DJD and obstructive sleep apnea. DVT prophylaxis: Heparin(or Xarelto)  Disposition:        Patient Active Problem List:     ANA MARÍA (obstructive sleep apnea)     Hypersomnia     Obesity (BMI 30-39. 9)     Ex-smoker     Hypoglycemia     CHRIS (acute kidney injury) ([address] [facility] [address]     Chronic kidney disease, stage V ([address] [facility] [address]      Subjective:     Chief Complaint   Patient presents with    Hypoglycemia       F/U:  Interval History: Patient seen in the ER. He is now eating better. No dizziness or diaphoresis. Objective:        Intake/Output Summary (Last 24 hours) at 12/26/2021 1207  Last data filed at 12/25/2021 2251  Gross per 24 hour   Intake    Output 650 ml   Net -650 ml      Vitals:   Vitals:    12/26/21 0600   BP: 130/69   Pulse: 88   Resp: 18   Temp:    SpO2: 100% Physical Exam:  General: No apparent respiratory distress. -American male. Eyes: Not pale. Anicteric. Neck: No neck mass or thyromegaly  Neurology: Awake and alert. Oriented x3. No gross focal weakness. Cardiovascular: Regular. No murmur no S3  Respiratory: No wheezes or crepitation  Abdomen: Peritoneal dialysis catheter noted. : No Koenig catheter. Extremities: No pedal edema. The feet are warm.     Electronically signed by Mary Hernandez MD on 12/26/2021 at 12:07 PM  [facility] Hospitalist

## 2021-12-26 NOTE — ED NOTES
[facility]  [facility]     Susie Rosa, [address] [facility]  12/26/21 5216

## 2021-12-26 NOTE — CONSULTS
[address] [place] [address], [place] [address]  Phone: [phone]  Office Hours: 8:30AM  4:30PM  Monday - Friday     Nephrology Progress Note  12/26/2021 8:30 AM  Subjective:   Admit Date: 12/24/2021  PCP: Arnold Pal MD  Interval History: coughing  Voided  Sugar better  Awake alert  Diet: ADULT DIET; Regular; Low Fat/Low Chol/High Fiber/PETR; Low Sodium (2 gm); Low Potassium (Less than 3000 mg/day); Low Phosphorus (Less than 1000 mg)      Data:   Scheduled Meds:   sodium chloride flush  5-40 mL IntraVENous 2 times per day    heparin (porcine)  5,000 Units SubCUTAneous 3 times per day    methylPREDNISolone  40 mg IntraVENous Daily     Continuous Infusions:   dextrose 5 % and 0.9 % NaCl      sodium chloride      dextrose       PRN Meds:sodium chloride flush, sodium chloride, ondansetron **OR** ondansetron, polyethylene glycol, acetaminophen **OR** acetaminophen, glucose, dextrose, glucagon (rDNA), dextrose, guaiFENesin-dextromethorphan  I/O last 3 completed shifts:  In: -   Out: 650 [Urine:650]  No intake/output data recorded. Intake/Output Summary (Last 24 hours) at 12/26/2021 0830  Last data filed at 12/25/2021 2251  Gross per 24 hour   Intake    Output 650 ml   Net -650 ml     CBC:   Recent Labs     12/25/21  0014 12/26/21  0700   WBC 6.2 4.5   HGB 8.0* 7.3*    227     BMP:    Recent Labs     12/24/21  2305 12/26/21  0310 12/26/21  0700   *  --  122*   K 4.6  --  5.2*   CL 99  --  96*   CO2 11*  --  12*   BUN 68*  --  69*   CREATININE 7.5*  --  7.2*   GLUCOSE 40* 232 165*     Troponin: No results for input(s): TROPONINI in the last 72 hours. BNP: No results for input(s): BNP in the last 72 hours. Lipids: No results for input(s): CHOL, HDL in the last 72 hours.     Invalid input(s): LDLCALCU  ABGs: No results found for: PHART, PO2ART, UOR5HLA    Objective:   Vitals: /69   Pulse 88   Temp 98.1 °F (36.7 °C) (Oral)   Resp 18   SpO2 100%   General appearance: alert and cooperative with exam  HEENT: Head: Normal, normocephalic, atraumatic. Neck: no adenopathy, no carotid bruit, no JVD, supple, symmetrical, trachea midline and thyroid not enlarged, symmetric, no tenderness/mass/nodules  Lungs: diminished breath sounds bibasilar  Heart: regular rate and rhythm, S1, S2 normal, no murmur, click, rub or gallop  Abdomen: soft, non-tender; bowel sounds normal; no masses,  no organomegaly and PD cath LLQ  Extremities: extremities normal, atraumatic, no cyanosis or edema  Neurologic: Mental status: alertness: alert    Assessment and Plan:     Patient Active Problem List:     ANA MARÍA (obstructive sleep apnea)     Hypersomnia     Obesity (BMI 30-39. 9)     Ex-smoker     Hypoglycemia     CHRIS (acute kidney injury) ([address]     Chronic kidney disease, stage V ([address]  Hyponatremia likely from hypotonic fluids    Plan  Change fluids to D5NS at 50  Restart loop diuretics  Check Na a t 1 pm  Adjust therapy  Hope to avoid starting RRT during this admission    Karine Moran MD, MD

## 2021-12-26 NOTE — PROGRESS NOTES
Progress Note( Dr. Farrah Garay)  12/26/2021  Subjective:   Admit Date: 12/24/2021  PCP: Karma Walton MD    Admitted For :Nausea vomiting in the setting of renal failure/hypoglycemia  Please note that patient is Covid positive    Consulted For: Better control of blood glucose       Interval History: Feels much better. However his serum sodium dropped to 122. It was earlier 125    Denies any chest pains,   Denies SOB . Denies nausea or vomiting. Not able to eat  No new bowel or bladder symptoms. Intake/Output Summary (Last 24 hours) at 12/26/2021 1437  Last data filed at 12/25/2021 2251  Gross per 24 hour   Intake    Output 650 ml   Net -650 ml       DATA    CBC:   Recent Labs     12/25/21  0014 12/26/21  0700   WBC 6.2 4.5   HGB 8.0* 7.3*    227    CMP:  Recent Labs     12/24/21  2305 12/26/21  0700   * 122*   K 4.6 5.2*   CL 99 96*   CO2 11* 12*   BUN 68* 69*   CREATININE 7.5* 7.2*   CALCIUM 7.4* 6.9*   PROT 6.6  --    LABALBU 2.8*  --    BILITOT 0.2  --    ALKPHOS 66  --    AST 13*  --    ALT 12  --      Lipids: No results found for: CHOL, HDL, TRIG  Glucose:  Recent Labs     12/26/21  0309 12/26/21  0653 12/26/21  0939   POCGLU 232* 176* 127*     [id number]:  Lab Results   Component Value Date    LABA1C 6.3 12/26/2021     High Sensitivity TSH: No results found for: TSHHS  Free T3: No results found for: FT3  Free T4:No results found for: T4FREE    XR CHEST PORTABLE   Final Result   No acute cardiopulmonary disease.               Scheduled Medicines   Medications:    furosemide  40 mg Oral Daily    sodium chloride flush  5-40 mL IntraVENous 2 times per day    heparin (porcine)  5,000 Units SubCUTAneous 3 times per day    methylPREDNISolone  40 mg IntraVENous Daily      Infusions:    dextrose 5 % and 0.9 % NaCl 50 mL/hr at 12/26/21 1014    sodium chloride      dextrose           Objective:   Vitals: /69   Pulse 88   Temp 98.1 °F (36.7 °C) (Oral)   Resp 18   SpO2 100% General appearance: alert and cooperative with exam  Neck: no JVD or bruit  Thyroid : Normal lobes   Lungs: Has Vesicular Breath sounds   Heart:  regular rate and rhythm  Abdomen: soft, non-tender; bowel sounds normal; no masses,  no organomegaly  Musculoskeletal: Normal  Extremities: extremities normal, , no edema  Neurologic:  Awake, alert, oriented to name, place and time. Cranial nerves II-XII are grossly intact. Motor is  intact. Sensory possible neuropathy t.,  and gait is normal.    Assessment:     Patient Active Problem List:     ANA MARÍA (obstructive sleep apnea)     Hypersomnia     Obesity (BMI 30-39. 9)     Ex-smoker     Hypoglycemia     CHRIS (acute kidney injury) [facility]     Chronic kidney disease, stage V ([facility]      Plan:     1. Reviewed POC blood glucose . Labs and X ray results   2. Reviewed Current Medicines  3. We change  IV to D5 normal saline  4. On D5 normal saline IV infusion   5. Also on Solu Medrol   6. monitor Blood glucose frequently   7. Modified  the dose  other medicines as needed   8. Will follow     .      Aye Jay MD, MD

## 2021-12-27 LAB
ADENOVIRUS DETECTION BY PCR: NOT DETECTED
ANION GAP SERPL CALCULATED.3IONS-SCNC: 15 MMOL/L (ref 4–16)
BORDETELLA PARAPERTUSSIS BY PCR: NOT DETECTED
BORDETELLA PERTUSSIS PCR: NOT DETECTED
BUN BLDV-MCNC: 72 MG/DL (ref 6–23)
CALCIUM SERPL-MCNC: 7.7 MG/DL (ref 8.3–10.6)
CHLAMYDOPHILA PNEUMONIA PCR: NOT DETECTED
CHLORIDE BLD-SCNC: 94 MMOL/L (ref 99–110)
CO2: 12 MMOL/L (ref 21–32)
CORONAVIRUS 229E PCR: NOT DETECTED
CORONAVIRUS HKU1 PCR: NOT DETECTED
CORONAVIRUS NL63 PCR: NOT DETECTED
CORONAVIRUS OC43 PCR: NOT DETECTED
CREAT SERPL-MCNC: 7.3 MG/DL (ref 0.9–1.3)
GFR AFRICAN AMERICAN: 10 ML/MIN/1.73M2
GFR NON-AFRICAN AMERICAN: 8 ML/MIN/1.73M2
GLUCOSE BLD-MCNC: 101 MG/DL (ref 70–99)
GLUCOSE BLD-MCNC: 136 MG/DL (ref 70–99)
GLUCOSE BLD-MCNC: 136 MG/DL (ref 70–99)
GLUCOSE BLD-MCNC: 142 MG/DL (ref 70–99)
GLUCOSE BLD-MCNC: 298 MG/DL (ref 70–99)
GLUCOSE BLD-MCNC: 369 MG/DL (ref 70–99)
HUMAN METAPNEUMOVIRUS PCR: NOT DETECTED
INFLUENZA A BY PCR: NOT DETECTED
INFLUENZA A H1 (2009) PCR: NOT DETECTED
INFLUENZA A H1 PANDEMIC PCR: NOT DETECTED
INFLUENZA A H3 PCR: NOT DETECTED
INFLUENZA B BY PCR: NOT DETECTED
MYCOPLASMA PNEUMONIAE PCR: NOT DETECTED
PARAINFLUENZA 1 PCR: NOT DETECTED
PARAINFLUENZA 2 PCR: NOT DETECTED
PARAINFLUENZA 3 PCR: NOT DETECTED
PARAINFLUENZA 4 PCR: NOT DETECTED
POTASSIUM SERPL-SCNC: 4.6 MMOL/L (ref 3.5–5.1)
RHINOVIRUS ENTEROVIRUS PCR: NOT DETECTED
RSV PCR: NOT DETECTED
SARS-COV-2: ABNORMAL
SODIUM BLD-SCNC: 121 MMOL/L (ref 135–145)

## 2021-12-27 PROCEDURE — 99232 SBSQ HOSP IP/OBS MODERATE 35: CPT | Performed by: INTERNAL MEDICINE

## 2021-12-27 PROCEDURE — 82306 VITAMIN D 25 HYDROXY: CPT

## 2021-12-27 PROCEDURE — 2060000000 HC ICU INTERMEDIATE R&B

## 2021-12-27 PROCEDURE — 6360000002 HC RX W HCPCS: Performed by: NURSE PRACTITIONER

## 2021-12-27 PROCEDURE — 2580000003 HC RX 258: Performed by: NURSE PRACTITIONER

## 2021-12-27 PROCEDURE — 6370000000 HC RX 637 (ALT 250 FOR IP): Performed by: FAMILY MEDICINE

## 2021-12-27 PROCEDURE — 6360000002 HC RX W HCPCS: Performed by: INTERNAL MEDICINE

## 2021-12-27 PROCEDURE — 6370000000 HC RX 637 (ALT 250 FOR IP): Performed by: INTERNAL MEDICINE

## 2021-12-27 PROCEDURE — 0202U NFCT DS 22 TRGT SARS-COV-2: CPT

## 2021-12-27 PROCEDURE — 80048 BASIC METABOLIC PNL TOTAL CA: CPT

## 2021-12-27 PROCEDURE — 2580000003 HC RX 258: Performed by: INTERNAL MEDICINE

## 2021-12-27 PROCEDURE — 6370000000 HC RX 637 (ALT 250 FOR IP): Performed by: NURSE PRACTITIONER

## 2021-12-27 RX ORDER — NIFEDIPINE 30 MG/1
60 TABLET, EXTENDED RELEASE ORAL DAILY
Status: DISCONTINUED | OUTPATIENT
Start: 2021-12-28 | End: 2021-12-28 | Stop reason: HOSPADM

## 2021-12-27 RX ORDER — ACETAMINOPHEN 325 MG/1
650 TABLET ORAL EVERY 6 HOURS PRN
Status: DISCONTINUED | OUTPATIENT
Start: 2021-12-27 | End: 2021-12-27 | Stop reason: SDUPTHER

## 2021-12-27 RX ORDER — ASPIRIN 81 MG/1
81 TABLET ORAL DAILY
Status: DISCONTINUED | OUTPATIENT
Start: 2021-12-28 | End: 2021-12-28 | Stop reason: HOSPADM

## 2021-12-27 RX ORDER — CALCIUM GLUCONATE 20 MG/ML
2000 INJECTION, SOLUTION INTRAVENOUS ONCE
Status: COMPLETED | OUTPATIENT
Start: 2021-12-27 | End: 2021-12-27

## 2021-12-27 RX ORDER — ALLOPURINOL 100 MG/1
100 TABLET ORAL DAILY
Status: DISCONTINUED | OUTPATIENT
Start: 2021-12-28 | End: 2021-12-28 | Stop reason: HOSPADM

## 2021-12-27 RX ORDER — METHYLPREDNISOLONE SODIUM SUCCINATE 40 MG/ML
20 INJECTION, POWDER, LYOPHILIZED, FOR SOLUTION INTRAMUSCULAR; INTRAVENOUS DAILY
Status: DISCONTINUED | OUTPATIENT
Start: 2021-12-27 | End: 2021-12-27

## 2021-12-27 RX ORDER — CLOPIDOGREL BISULFATE 75 MG/1
75 TABLET ORAL DAILY
Status: DISCONTINUED | OUTPATIENT
Start: 2021-12-28 | End: 2021-12-28 | Stop reason: HOSPADM

## 2021-12-27 RX ORDER — AMLODIPINE BESYLATE 5 MG/1
5 TABLET ORAL DAILY
Status: DISCONTINUED | OUTPATIENT
Start: 2021-12-28 | End: 2021-12-28

## 2021-12-27 RX ORDER — SODIUM CHLORIDE 9 MG/ML
INJECTION, SOLUTION INTRAVENOUS CONTINUOUS
Status: DISCONTINUED | OUTPATIENT
Start: 2021-12-27 | End: 2021-12-28 | Stop reason: HOSPADM

## 2021-12-27 RX ORDER — PANTOPRAZOLE SODIUM 40 MG/1
40 TABLET, DELAYED RELEASE ORAL
Status: DISCONTINUED | OUTPATIENT
Start: 2021-12-28 | End: 2021-12-28 | Stop reason: HOSPADM

## 2021-12-27 RX ORDER — BUPROPION HYDROCHLORIDE 150 MG/1
150 TABLET, EXTENDED RELEASE ORAL 2 TIMES DAILY
Status: DISCONTINUED | OUTPATIENT
Start: 2021-12-27 | End: 2021-12-28 | Stop reason: HOSPADM

## 2021-12-27 RX ORDER — CALCIUM CARBONATE 200(500)MG
500 TABLET,CHEWABLE ORAL 3 TIMES DAILY PRN
Status: DISCONTINUED | OUTPATIENT
Start: 2021-12-27 | End: 2021-12-28 | Stop reason: HOSPADM

## 2021-12-27 RX ORDER — ATORVASTATIN CALCIUM 40 MG/1
40 TABLET, FILM COATED ORAL NIGHTLY
Status: DISCONTINUED | OUTPATIENT
Start: 2021-12-27 | End: 2021-12-28 | Stop reason: HOSPADM

## 2021-12-27 RX ADMIN — HEPARIN SODIUM 5000 UNITS: 5000 INJECTION INTRAVENOUS; SUBCUTANEOUS at 15:47

## 2021-12-27 RX ADMIN — SODIUM CHLORIDE: 9 INJECTION, SOLUTION INTRAVENOUS at 08:44

## 2021-12-27 RX ADMIN — BUPROPION HYDROCHLORIDE 150 MG: 150 TABLET, EXTENDED RELEASE ORAL at 22:59

## 2021-12-27 RX ADMIN — HYDRALAZINE HYDROCHLORIDE 10 MG: 20 INJECTION INTRAMUSCULAR; INTRAVENOUS at 23:16

## 2021-12-27 RX ADMIN — INSULIN LISPRO 3 UNITS: 100 INJECTION, SOLUTION INTRAVENOUS; SUBCUTANEOUS at 17:48

## 2021-12-27 RX ADMIN — ONDANSETRON 4 MG: 2 INJECTION INTRAMUSCULAR; INTRAVENOUS at 08:58

## 2021-12-27 RX ADMIN — SODIUM CHLORIDE, PRESERVATIVE FREE 10 ML: 5 INJECTION INTRAVENOUS at 08:42

## 2021-12-27 RX ADMIN — GUAIFENESIN AND DEXTROMETHORPHAN 5 ML: 100; 10 SYRUP ORAL at 22:59

## 2021-12-27 RX ADMIN — ONDANSETRON 4 MG: 2 INJECTION INTRAMUSCULAR; INTRAVENOUS at 21:27

## 2021-12-27 RX ADMIN — HEPARIN SODIUM 5000 UNITS: 5000 INJECTION INTRAVENOUS; SUBCUTANEOUS at 22:59

## 2021-12-27 RX ADMIN — CALCIUM GLUCONATE 2000 MG: 20 INJECTION, SOLUTION INTRAVENOUS at 05:21

## 2021-12-27 RX ADMIN — ATORVASTATIN CALCIUM 40 MG: 40 TABLET, FILM COATED ORAL at 22:59

## 2021-12-27 RX ADMIN — METHYLPREDNISOLONE SODIUM SUCCINATE 20 MG: 40 INJECTION, POWDER, FOR SOLUTION INTRAMUSCULAR; INTRAVENOUS at 08:45

## 2021-12-27 RX ADMIN — SODIUM CHLORIDE, PRESERVATIVE FREE 10 ML: 5 INJECTION INTRAVENOUS at 22:58

## 2021-12-27 RX ADMIN — HEPARIN SODIUM 5000 UNITS: 5000 INJECTION INTRAVENOUS; SUBCUTANEOUS at 08:45

## 2021-12-27 RX ADMIN — RIVAROXABAN 2.5 MG: 2.5 TABLET, FILM COATED ORAL at 22:59

## 2021-12-27 RX ADMIN — ACETAMINOPHEN 650 MG: 325 TABLET ORAL at 22:59

## 2021-12-27 RX ADMIN — FUROSEMIDE 40 MG: 20 TABLET ORAL at 08:45

## 2021-12-27 ASSESSMENT — PAIN SCALES - GENERAL
PAINLEVEL_OUTOF10: 0
PAINLEVEL_OUTOF10: 0

## 2021-12-27 NOTE — PROGRESS NOTES
 dextrose           Objective:   Vitals: BP (!) 160/147   Pulse 94   Temp 98.1 °F (36.7 °C) (Oral)   Resp 18   SpO2 98%   General appearance: alert and cooperative with exam  Neck: no JVD or bruit  Thyroid : Normal lobes   Lungs: Has Vesicular Breath sounds   Heart:  regular rate and rhythm  Abdomen: soft, non-tender; bowel sounds normal; no masses,  no organomegaly  Musculoskeletal: Normal  Extremities: extremities normal, , no edema  Neurologic:  Awake, alert, oriented to name, place and time. Cranial nerves II-XII are grossly intact. Motor is  intact. Sensory possible neuropathy t.,  and gait is normal.    Assessment:     Patient Active Problem List:     ANA MARÍA (obstructive sleep apnea)     Hypersomnia     Obesity (BMI 30-39. 9)     Ex-smoker     Hypoglycemia     CHRIS (acute kidney injury) ([address]     Chronic kidney disease, stage V ([address]      Plan:     1. Reviewed POC blood glucose . Labs and X ray results   2. Reviewed Current Medicines  3. We change  IV to D5 normal saline  4. Discontinued D5 normal   5. Will continue on Normal  saline IV infusion   6. We will taper down and discontinue Solu Medrol   7. monitor Blood glucose frequently   8. Modified  the dose  other medicines as needed   9. Will follow     .      Mimi Booth MD, MD

## 2021-12-27 NOTE — PROGRESS NOTES
Hospitalist Progress Note      Name:  Gerry Porras /Age/Sex: 1965  (49 y.o. male)   MRN & CSN:  [id number] & [id number] Admission Date/Time: 2021  9:40 PM   Location:  -A PCP: Kassandra Kimball MD         Hospital Day: 4    Assessment and Plan:   Gerry Porras is a 49 y.o.  male  who presents with hypoglycemia    1. Type 2 diabetes  1. Came in with hypoglycemia~BS dropped to 40  2. A1C () 6.3  3. Hold all oral diabetic medications   4. Hold lantus   5. Consult endocrinology~rec's appreciated  6. accu check Ac/Hs     2. Essential hypertension         1. Continue home medications          2. Ordered on ~monitor BP closely     3. CKD/ESRD        1. In process of getting on [facility] transplant list        2. Has catheter for peritoneal dialysis but is not on dialysis yet        3. Nephrologist is at [facility] nephrology     4. Hyponatremia        1. 2/2 CKD        2. NA () 125-----> () 121        3. NA was 133 in 2021        4. Consult nephrology~rec's appreciated    5. Covid 19         1. Is vaccinated~tested positive on          2. CXR () shows no acute pulmonary disease         3. Respiratory panel pending          4. States is asymptomatic~ ? False positive       Diet ADULT DIET; Regular; Low Fat/Low Chol/High Fiber/PETR; Low Sodium (2 gm); Low Potassium (Less than 3000 mg/day); Low Phosphorus (Less than 1000 mg)   DVT Prophylaxis [] Lovenox, [x]  Heparin, [] SCDs, [] Ambulation   GI Prophylaxis [] PPI,  [] H2 Blocker,  [] Carafate,  [x] Diet/Tube Feeds   Code Status Full Code   Disposition Patient requires continued admission due to hypoglycemia         History of Present Illness:     Chief Complaint: Hypoglycemia  Gerry Porras is a 49 y.o.  male  who presents with hypoglycemia s/p vomiting. Pt had poor oral intake and was still his medications at home.  Pt states that he is feeling better now than he did when he came in.  Pt states that he is followed by a nephrologist at [facility] and is in the process of being approved for the transplant list with [facility]. Pt states that he is awaiting for his case to be present before the board for approval. Pt states that he has a catheter in place for dialysis but has not been on dialysis and states that the catheter has to come out and be replaced. Pt denies any chest pain or shortness of breath. Pt denies any other concerns at this time. Ten point ROS reviewed negative, unless as noted above    Objective: Intake/Output Summary (Last 24 hours) at 12/27/2021 1500  Last data filed at 12/27/2021 1236  Gross per 24 hour   Intake    Output 2300 ml   Net -2300 ml      Vitals:   Vitals:    12/27/21 0938   BP: (!) 158/108   Pulse: 96   Resp: 18   Temp: 97.4 °F (36.3 °C)   SpO2: 98%     Physical Exam:   GEN Awake male, sitting upright in bed in no apparent distress. Appears given age. EYES Pupils are equally round. No scleral erythema, discharge, or conjunctivitis. HENT Mucous membranes are moist. Oral pharynx without exudates, no evidence of thrush. NECK Supple, no apparent thyromegaly or masses. RESP diminished to auscultation, no wheezes, rales or rhonchi. Symmetric chest movement while on room air. CARDIO/VASC S1/S2 auscultated. Regular rate without appreciable murmurs, rubs, or gallops. No JVD or carotid bruits. Peripheral pulses equal bilaterally and palpable. No peripheral edema. GI Abdomen is soft without significant tenderness, masses, or guarding. Bowel sounds are normoactive. Rectal exam deferred.  No costovertebral angle tenderness. Koenig catheter is not present. HEME/LYMPH No palpable cervical lymphadenopathy and no hepatosplenomegaly. No petechiae or ecchymoses. MSK No gross joint deformities. SKIN Normal coloration, warm, dry. NEURO Cranial nerves appear grossly intact, normal speech, no lateralizing weakness. PSYCH Awake, alert, oriented x 4.   Affect appropriate. Medications:   Medications:    methylPREDNISolone  20 mg IntraVENous Daily    furosemide  40 mg Oral Daily    insulin lispro  0-6 Units SubCUTAneous TID WC    insulin lispro  0-3 Units SubCUTAneous 2 times per day    sodium chloride flush  5-40 mL IntraVENous 2 times per day    heparin (porcine)  5,000 Units SubCUTAneous 3 times per day      Infusions:    sodium chloride 50 mL/hr at 12/27/21 0844    sodium chloride      dextrose       PRN Meds: sodium chloride flush, 5-40 mL, PRN  sodium chloride, 25 mL, PRN  ondansetron, 4 mg, Q8H PRN   Or  ondansetron, 4 mg, Q6H PRN  polyethylene glycol, 17 g, Daily PRN  acetaminophen, 650 mg, Q6H PRN   Or  acetaminophen, 650 mg, Q6H PRN  glucose, 15 g, PRN  dextrose, 12.5 g, PRN  glucagon (rDNA), 1 mg, PRN  dextrose, 100 mL/hr, PRN  guaiFENesin-dextromethorphan, 5 mL, Q4H PRN          Patient is still admitted because low blood sugar. The anticipated discharge is in greater than 24 hours.      Electronically signed by MANINDER Godoy CNP on 12/27/2021 at 3:00 PM

## 2021-12-27 NOTE — ED NOTES
Report called to Jerrica Johnson RN at this time.  Patient to go to room 2019     Valarie Uribe RN  12/27/21 9943

## 2021-12-27 NOTE — ED NOTES
Attempted to call report to Comfort Mathew RN at [phone] for patient to go to room 2019.  No answer to call back     Jennifer Donis RN  12/27/21 6904

## 2021-12-27 NOTE — PROGRESS NOTES
[address] [place] [address] [address] [place] [address]  Phone: [phone]  Office Hours: 8:30AM  4:30PM  Monday - Friday     Nephrology Progress Note  12/27/2021 12:47 PM  Subjective:   Admit Date: 12/24/2021  PCP: Alexandra Pace MD  Interval History: cough unchanged  Voided  Sugar better  Awake alert  Diet: ADULT DIET; Regular; Low Fat/Low Chol/High Fiber/PETR; Low Sodium (2 gm); Low Potassium (Less than 3000 mg/day);  Low Phosphorus (Less than 1000 mg)      Data:   Scheduled Meds:   methylPREDNISolone  20 mg IntraVENous Daily    furosemide  40 mg Oral Daily    insulin lispro  0-6 Units SubCUTAneous TID WC    insulin lispro  0-3 Units SubCUTAneous 2 times per day    sodium chloride flush  5-40 mL IntraVENous 2 times per day    heparin (porcine)  5,000 Units SubCUTAneous 3 times per day     Continuous Infusions:   sodium chloride 50 mL/hr at 12/27/21 0844    sodium chloride      dextrose       PRN Meds:sodium chloride flush, sodium chloride, ondansetron **OR** ondansetron, polyethylene glycol, acetaminophen **OR** acetaminophen, glucose, dextrose, glucagon (rDNA), dextrose, guaiFENesin-dextromethorphan  I/O last 3 completed shifts:  In: -   Out: 1700 [Urine:1700]  I/O this shift:  In: -   Out: 600 [Urine:600]    Intake/Output Summary (Last 24 hours) at 12/27/2021 1247  Last data filed at 12/27/2021 1236  Gross per 24 hour   Intake    Output 2300 ml   Net -2300 ml     CBC:   Recent Labs     12/25/21  0014 12/26/21  0700   WBC 6.2 4.5   HGB 8.0* 7.3*    227     BMP:    Recent Labs     12/24/21  2305 12/26/21  0310 12/26/21  0700 12/26/21  0700 12/26/21  0941 12/26/21  1453 12/27/21  0515   *  --  122*  --   --   --  121*   K 4.6  --  5.2*  --   --   --  4.6   CL 99  --  96*  --   --   --  94*   CO2 11*  --  12*  --   --   --  12*   BUN 68*  --  69*  --   --   --  72*   CREATININE 7.5*  --  7.2*  --   --   --  7.3*   GLUCOSE 40*   < > 165*   < > 127 160 136*    < > = values in this interval not displayed. Troponin: No results for input(s): TROPONINI in the last 72 hours. BNP: No results for input(s): BNP in the last 72 hours. Lipids: No results for input(s): CHOL, HDL in the last 72 hours. Invalid input(s): LDLCALCU  ABGs: No results found for: PHART, PO2ART, IIK6YJR    Objective:   Vitals: BP (!) 158/108   Pulse 96   Temp 97.4 °F (36.3 °C) (Axillary)   Resp 18   SpO2 98%   General appearance: alert and cooperative with exam  HEENT: Head: Normal, normocephalic, atraumatic. Neck: no adenopathy, no carotid bruit, no JVD, supple, symmetrical, trachea midline and thyroid not enlarged, symmetric, no tenderness/mass/nodules  Lungs: diminished breath sounds bibasilar  Heart: regular rate and rhythm, S1, S2 normal, no murmur, click, rub or gallop  Abdomen: soft, non-tender; bowel sounds normal; no masses,  no organomegaly and PD cath LLQ  Extremities: extremities normal, atraumatic, no cyanosis or edema  Neurologic: Mental status: alertness: alert    Assessment and Plan:     Patient Active Problem List:     ANA MARÍA (obstructive sleep apnea)     Hypersomnia     Obesity (BMI 30-39. 9)     Ex-smoker     Hypoglycemia     CHRIS (acute kidney injury) ([address]     Chronic kidney disease, stage V ([address]  Hyponatremia likely from hypotonic fluids    Plan  Change fluids to NS  Cont lasix  Check Na a t 5 pm  Adjust therapy  Hope to avoid starting RRT during this admission    Nick Ram MD, MD

## 2021-12-28 VITALS
OXYGEN SATURATION: 97 % | TEMPERATURE: 97.5 F | RESPIRATION RATE: 13 BRPM | DIASTOLIC BLOOD PRESSURE: 95 MMHG | SYSTOLIC BLOOD PRESSURE: 143 MMHG | HEART RATE: 108 BPM

## 2021-12-28 PROBLEM — D63.1 ANEMIA DUE TO CHRONIC KIDNEY DISEASE: Status: ACTIVE | Noted: 2021-12-28

## 2021-12-28 PROBLEM — U07.1 COVID-19: Status: ACTIVE | Noted: 2021-12-28

## 2021-12-28 PROBLEM — N18.9 ANEMIA DUE TO CHRONIC KIDNEY DISEASE: Status: ACTIVE | Noted: 2021-12-28

## 2021-12-28 PROBLEM — E87.1 HYPONATREMIA: Status: ACTIVE | Noted: 2021-12-28

## 2021-12-28 LAB
ANION GAP SERPL CALCULATED.3IONS-SCNC: 19 MMOL/L (ref 4–16)
BUN BLDV-MCNC: 74 MG/DL (ref 6–23)
CALCIUM SERPL-MCNC: 7.6 MG/DL (ref 8.3–10.6)
CHLORIDE BLD-SCNC: 96 MMOL/L (ref 99–110)
CO2: 11 MMOL/L (ref 21–32)
CREAT SERPL-MCNC: 7.1 MG/DL (ref 0.9–1.3)
D DIMER: 694 NG/ML(DDU)
GFR AFRICAN AMERICAN: 10 ML/MIN/1.73M2
GFR NON-AFRICAN AMERICAN: 8 ML/MIN/1.73M2
GLUCOSE BLD-MCNC: 104 MG/DL (ref 70–99)
GLUCOSE BLD-MCNC: 107 MG/DL (ref 70–99)
GLUCOSE BLD-MCNC: 134 MG/DL (ref 70–99)
HCT VFR BLD CALC: 26.4 % (ref 42–52)
HEMOGLOBIN: 8.3 GM/DL (ref 13.5–18)
HIGH SENSITIVE C-REACTIVE PROTEIN: 7.6 MG/L
LACTIC ACID, SEPSIS: 0.6 MMOL/L (ref 0.5–1.9)
MAGNESIUM: 1.5 MG/DL (ref 1.8–2.4)
PHOSPHORUS: 6.7 MG/DL (ref 2.5–4.9)
POTASSIUM SERPL-SCNC: 4.7 MMOL/L (ref 3.5–5.1)
SODIUM BLD-SCNC: 126 MMOL/L (ref 135–145)
VITAMIN D 25-HYDROXY: 12.61 NG/ML

## 2021-12-28 PROCEDURE — 83735 ASSAY OF MAGNESIUM: CPT

## 2021-12-28 PROCEDURE — 82306 VITAMIN D 25 HYDROXY: CPT

## 2021-12-28 PROCEDURE — 6360000002 HC RX W HCPCS: Performed by: INTERNAL MEDICINE

## 2021-12-28 PROCEDURE — 86141 C-REACTIVE PROTEIN HS: CPT

## 2021-12-28 PROCEDURE — 6370000000 HC RX 637 (ALT 250 FOR IP): Performed by: FAMILY MEDICINE

## 2021-12-28 PROCEDURE — 99232 SBSQ HOSP IP/OBS MODERATE 35: CPT | Performed by: INTERNAL MEDICINE

## 2021-12-28 PROCEDURE — 6370000000 HC RX 637 (ALT 250 FOR IP): Performed by: NURSE PRACTITIONER

## 2021-12-28 PROCEDURE — 85379 FIBRIN DEGRADATION QUANT: CPT

## 2021-12-28 PROCEDURE — 80048 BASIC METABOLIC PNL TOTAL CA: CPT

## 2021-12-28 PROCEDURE — 85014 HEMATOCRIT: CPT

## 2021-12-28 PROCEDURE — 82962 GLUCOSE BLOOD TEST: CPT

## 2021-12-28 PROCEDURE — 6370000000 HC RX 637 (ALT 250 FOR IP): Performed by: INTERNAL MEDICINE

## 2021-12-28 PROCEDURE — 2580000003 HC RX 258: Performed by: NURSE PRACTITIONER

## 2021-12-28 PROCEDURE — 83605 ASSAY OF LACTIC ACID: CPT

## 2021-12-28 PROCEDURE — 85018 HEMOGLOBIN: CPT

## 2021-12-28 PROCEDURE — 6360000002 HC RX W HCPCS: Performed by: NURSE PRACTITIONER

## 2021-12-28 PROCEDURE — 84100 ASSAY OF PHOSPHORUS: CPT

## 2021-12-28 PROCEDURE — 36415 COLL VENOUS BLD VENIPUNCTURE: CPT

## 2021-12-28 PROCEDURE — 2580000003 HC RX 258: Performed by: INTERNAL MEDICINE

## 2021-12-28 RX ORDER — MAGNESIUM SULFATE IN WATER 40 MG/ML
2000 INJECTION, SOLUTION INTRAVENOUS ONCE
Status: COMPLETED | OUTPATIENT
Start: 2021-12-28 | End: 2021-12-28

## 2021-12-28 RX ORDER — GLIPIZIDE 5 MG/1
2.5 TABLET ORAL 2 TIMES DAILY
Qty: 60 TABLET | Refills: 0 | Status: SHIPPED | OUTPATIENT
Start: 2021-12-28

## 2021-12-28 RX ORDER — SODIUM BICARBONATE 650 MG/1
1300 TABLET ORAL 3 TIMES DAILY
Qty: 30 TABLET | Refills: 0 | Status: SHIPPED | OUTPATIENT
Start: 2021-12-28 | End: 2022-01-02

## 2021-12-28 RX ORDER — METOPROLOL TARTRATE 50 MG/1
50 TABLET, FILM COATED ORAL 2 TIMES DAILY
Status: DISCONTINUED | OUTPATIENT
Start: 2021-12-28 | End: 2021-12-28 | Stop reason: HOSPADM

## 2021-12-28 RX ORDER — SODIUM BICARBONATE 650 MG/1
1300 TABLET ORAL 3 TIMES DAILY
Status: DISCONTINUED | OUTPATIENT
Start: 2021-12-28 | End: 2021-12-28 | Stop reason: HOSPADM

## 2021-12-28 RX ORDER — SODIUM BICARBONATE 650 MG/1
1300 TABLET ORAL 3 TIMES DAILY
Qty: 30 TABLET | Refills: 0 | Status: SHIPPED | OUTPATIENT
Start: 2021-12-28 | End: 2021-12-28

## 2021-12-28 RX ORDER — FUROSEMIDE 40 MG/1
40 TABLET ORAL DAILY
Qty: 30 TABLET | Refills: 0 | Status: SHIPPED | OUTPATIENT
Start: 2021-12-29 | End: 2021-12-28 | Stop reason: HOSPADM

## 2021-12-28 RX ORDER — METOPROLOL TARTRATE 50 MG/1
25 TABLET, FILM COATED ORAL 2 TIMES DAILY
Qty: 60 TABLET | Refills: 0 | Status: SHIPPED | OUTPATIENT
Start: 2021-12-28

## 2021-12-28 RX ADMIN — AMLODIPINE BESYLATE 5 MG: 5 TABLET ORAL at 08:11

## 2021-12-28 RX ADMIN — SODIUM CHLORIDE, PRESERVATIVE FREE 10 ML: 5 INJECTION INTRAVENOUS at 08:11

## 2021-12-28 RX ADMIN — ONDANSETRON 4 MG: 2 INJECTION INTRAMUSCULAR; INTRAVENOUS at 05:58

## 2021-12-28 RX ADMIN — ALLOPURINOL 100 MG: 100 TABLET ORAL at 08:11

## 2021-12-28 RX ADMIN — NIFEDIPINE 60 MG: 30 TABLET, FILM COATED, EXTENDED RELEASE ORAL at 08:11

## 2021-12-28 RX ADMIN — SODIUM CHLORIDE: 9 INJECTION, SOLUTION INTRAVENOUS at 06:33

## 2021-12-28 RX ADMIN — BUPROPION HYDROCHLORIDE 150 MG: 150 TABLET, EXTENDED RELEASE ORAL at 08:11

## 2021-12-28 RX ADMIN — CLOPIDOGREL BISULFATE 75 MG: 75 TABLET ORAL at 08:11

## 2021-12-28 RX ADMIN — FUROSEMIDE 40 MG: 20 TABLET ORAL at 08:11

## 2021-12-28 RX ADMIN — CALCIUM CARBONATE 500 MG: 500 TABLET, CHEWABLE ORAL at 05:57

## 2021-12-28 RX ADMIN — SODIUM BICARBONATE 1300 MG: 650 TABLET ORAL at 11:40

## 2021-12-28 RX ADMIN — PANTOPRAZOLE SODIUM 40 MG: 40 TABLET, DELAYED RELEASE ORAL at 05:57

## 2021-12-28 RX ADMIN — HEPARIN SODIUM 5000 UNITS: 5000 INJECTION INTRAVENOUS; SUBCUTANEOUS at 05:58

## 2021-12-28 RX ADMIN — ASPIRIN 81 MG: 81 TABLET, COATED ORAL at 08:11

## 2021-12-28 RX ADMIN — RIVAROXABAN 2.5 MG: 2.5 TABLET, FILM COATED ORAL at 08:13

## 2021-12-28 RX ADMIN — MAGNESIUM SULFATE 2000 MG: 2 INJECTION INTRAVENOUS at 14:06

## 2021-12-28 RX ADMIN — HYDRALAZINE HYDROCHLORIDE 10 MG: 20 INJECTION INTRAMUSCULAR; INTRAVENOUS at 05:50

## 2021-12-28 ASSESSMENT — PAIN SCALES - GENERAL
PAINLEVEL_OUTOF10: 0

## 2021-12-28 NOTE — PROGRESS NOTES
Nephrology Progress Note        [address] [address]  Phone: [phone]  Office Hours: 8:30AM  4:30PM  Monday - Friday 12/28/2021 7:43 AM  Subjective:   Admit Date: 12/24/2021  PCP: Ana Paula Malik MD  Interval History: Doing ok  On room air    Diet: ADULT DIET; Regular; Low Fat/Low Chol/High Fiber/PETR; Low Sodium (2 gm); Low Potassium (Less than 3000 mg/day); Low Phosphorus (Less than 1000 mg)      Data:   Scheduled Meds:   insulin lispro  0-6 Units SubCUTAneous 2 times per day    allopurinol  100 mg Oral Daily    amLODIPine  5 mg Oral Daily    aspirin  81 mg Oral Daily    atorvastatin  40 mg Oral Nightly    buPROPion  150 mg Oral BID    clopidogrel  75 mg Oral Daily    NIFEdipine  60 mg Oral Daily    pantoprazole  40 mg Oral QAM AC    rivaroxaban  2.5 mg Oral BID    insulin lispro  0-12 Units SubCUTAneous TID WC    furosemide  40 mg Oral Daily    sodium chloride flush  5-40 mL IntraVENous 2 times per day    heparin (porcine)  5,000 Units SubCUTAneous 3 times per day     Continuous Infusions:   sodium chloride 50 mL/hr at 12/28/21 7198    sodium chloride      dextrose       PRN Meds:hydrALAZINE (APRESOLINE) ivpb, calcium carbonate, sodium chloride flush, sodium chloride, ondansetron **OR** ondansetron, polyethylene glycol, acetaminophen **OR** acetaminophen, glucose, dextrose, glucagon (rDNA), dextrose, guaiFENesin-dextromethorphan  I/O last 3 completed shifts: In: 10 [I.V.:10]  Out: 2350 [Urine:2350]  No intake/output data recorded.     Intake/Output Summary (Last 24 hours) at 12/28/2021 0743  Last data filed at 12/28/2021 0763  Gross per 24 hour   Intake 10 ml   Output 2350 ml   Net -2340 ml       CBC:   Recent Labs     12/26/21  0700   WBC 4.5   HGB 7.3*          BMP:    Recent Labs     12/26/21  0700 12/26/21  0700 12/26/21  0941 12/26/21  1453 12/27/21  0515   *  --   --   --  121*   K 5.2*  --   --   --  4.6   CL 96*  --   -- --  94*   CO2 12*  --   --   --  12*   BUN 69*  --   --   --  72*   CREATININE 7.2*  --   --   --  7.3*   GLUCOSE 165*   < > 127 160 136*    < > = values in this interval not displayed. Hepatic: No results for input(s): AST, ALT, ALB, BILITOT, ALKPHOS in the last 72 hours. Troponin: No results for input(s): TROPONINI in the last 72 hours. BNP: No results for input(s): BNP in the last 72 hours. Lipids: No results for input(s): CHOL, HDL in the last 72 hours. Invalid input(s): LDLCALCU  ABGs: No results found for: PHART, PO2ART, VQR2GWL  INR: No results for input(s): INR in the last 72 hours. Objective:   Vitals: BP (!) 155/112   Pulse 98   Temp 98.3 °F (36.8 °C) (Oral)   Resp 17   SpO2 96%   General appearance: alert and cooperative with exam, in no acute distress  HEENT: normocephalic, atraumatic,   Neck: supple, trachea midline  Lungs: clear to auscultation bilaterally, breathing comfortably on room air  Heart[de-identified] regular rate and rhythm, S1, S2 normal,  Abdomen: soft, non-tender; non distended, +bowel sounds  Extremities: extremities atraumatic, no cyanosis or edema  Neurologic: alert, oriented, follows commands, interactive    Assessment and Plan:       Patient Active Problem List    Diagnosis Date Noted    Hypoglycemia 12/25/2021    CHRIS (acute kidney injury) ([address]     Chronic kidney disease, stage V ([address]     ANA MARÍA (obstructive sleep apnea) 11/25/2020    Hypersomnia 11/25/2020    Obesity (BMI 30-39.9) 11/25/2020    Ex-smoker 11/25/2020     No labs today?   Continue supportive mgmt  Pt to follow up with his regular nephrologist upon dc  Avoid nephrotoxins    Thank you                Electronically signed by Azeem Jerry DO on 12/28/2021 at 7:43 AM    MD Eulogio Bull Aas, DO [address],  [place]  [facility], [facility] [address]  PHONE: [phone]  FAX: [phone]

## 2021-12-28 NOTE — PROGRESS NOTES
12/28/2021 2:56 PM  Patient Room #: 2019/2019-A  Patient Name: Victor Hugo Mueller    (Step 1 Done by RN if possible otherwise call Pulmonary Diagnostics)  1. Place patient on room air at rest for at least 30 minutes. If patient falls below 88% before 30 minutes then you can record the level and stop. Record room air saturation level __ %. If patient is at 88% or below, they will qualify for home oxygen and you can stop. If level does not fall below 88%, fill in level above. If indicated continue to Step 2. Signature:_____ Date: ___  (Step 2&3 Done by [facility])  2. Ambulate patient on room air until saturation falls below 89%. Record level of room air saturation with ambulation___ %. Next, place patient back on ___lpm oxygen and ambulate, record level __%. (Note:  this level must show improvement from room air level done with ambulation.)  If patients saturation on room air with ambulation is 88% or below AND patient shows improvement with oxygen during ambulation, they will qualify for home oxygen and you can stop. If patient does not drop below 89%, then patient should have an overnight oximetry trending on room air to see if level falls below 88%. Complete level in Step 3 below. 3. Room air overnight oximetry level 88 % for___  cumulative minutes. If patients room air oxygen level is < 89% for at least 5 cumulative minutes, patient will qualify for home oxygen and you can stop. (Attach Night Trending Report)  ANA MARÍA    Complete order below: Diagnosis:____  Home oxygen at:  Length of Need: ? Lifetime ? X 3 Months     ___lpm or __%   via  [] nasal cannula  []mask  [] other: Conserving Device         []continuous []  with activity  []  Nocturnal   [] Portable Tanks []  Concentrator  [] Conserving Device        Therapist Signature:_______     Date:  ___  Physician Signature:  __Electronically Signed in EMR_    Date:___  Physician Printed Name:  Madan Ordoñez MD NPI:  [id number]    [] Patient Qualifies      [] Patient Does NOT qualify

## 2021-12-28 NOTE — PROGRESS NOTES
Progress Note( Dr. Mindy Forte)  12/28/2021  Subjective:   Admit Date: 12/24/2021  PCP: Brigette Wray MD    Admitted For :Nausea vomiting in the setting of renal failure/hypoglycemia  Please note that patient is Covid positive    Consulted For: Better control of blood glucose       Interval History: Feels much better. However his serum sodium dropped to 122. It was earlier 125    Denies any chest pains,     Denies SOB . Denies nausea or vomiting. Not able to eat  No new bowel or bladder symptoms. Intake/Output Summary (Last 24 hours) at 12/28/2021 0605  Last data filed at 12/28/2021 0100  Gross per 24 hour   Intake 10 ml   Output 1850 ml   Net -1840 ml       DATA    CBC:   Recent Labs     12/26/21  0700   WBC 4.5   HGB 7.3*       CMP:  Recent Labs     12/26/21  0700 12/27/21  0515   * 121*   K 5.2* 4.6   CL 96* 94*   CO2 12* 12*   BUN 69* 72*   CREATININE 7.2* 7.3*   CALCIUM 6.9* 7.7*     Lipids: No results found for: CHOL, HDL, TRIG  Glucose:  Recent Labs     12/27/21  1704 12/27/21  1955 12/28/21  0545   POCGLU 298* 369* 107*     [id number]:  Lab Results   Component Value Date    LABA1C 6.3 12/26/2021     High Sensitivity TSH: No results found for: TSHHS  Free T3: No results found for: FT3  Free T4:No results found for: T4FREE    XR CHEST PORTABLE   Final Result   No acute cardiopulmonary disease.               Scheduled Medicines   Medications:    insulin lispro  0-6 Units SubCUTAneous 2 times per day    allopurinol  100 mg Oral Daily    amLODIPine  5 mg Oral Daily    aspirin  81 mg Oral Daily    atorvastatin  40 mg Oral Nightly    buPROPion  150 mg Oral BID    clopidogrel  75 mg Oral Daily    NIFEdipine  60 mg Oral Daily    pantoprazole  40 mg Oral QAM AC    rivaroxaban  2.5 mg Oral BID    insulin lispro  0-12 Units SubCUTAneous TID WC    furosemide  40 mg Oral Daily    sodium chloride flush  5-40 mL IntraVENous 2 times per day    heparin (porcine)  5,000 Units SubCUTAneous 3 times per day      Infusions:    sodium chloride 50 mL/hr at 12/27/21 0844    sodium chloride      dextrose           Objective:   Vitals: BP (!) 155/112   Pulse 108   Temp 98.3 °F (36.8 °C) (Oral)   Resp 19   SpO2 100%   General appearance: alert and cooperative with exam  Neck: no JVD or bruit  Thyroid : Normal lobes   Lungs: Has Vesicular Breath sounds   Heart:  regular rate and rhythm  Abdomen: soft, non-tender; bowel sounds normal; no masses,  no organomegaly  Musculoskeletal: Normal  Extremities: extremities normal, , no edema  Neurologic:  Awake, alert, oriented to name, place and time. Cranial nerves II-XII are grossly intact. Motor is  intact. Sensory possible neuropathy t.,  and gait is normal.    Assessment:     Patient Active Problem List:     ANA MARÍA (obstructive sleep apnea)     Hypersomnia     Obesity (BMI 30-39. 9)     Ex-smoker     Hypoglycemia     CHRIS (acute kidney injury) ([facility] [address]     Chronic kidney disease, stage V ([facility] [address]      Plan:     1. Reviewed POC blood glucose . Labs and X ray results   2. Reviewed Current Medicines  3. We change  IV to D5 normal saline  4. Discontinued D5 normal   5. Will continue on Normal  saline IV infusion   6. We will taper down and discontinue Solu Medrol   7. monitor Blood glucose frequently   8. Modified  the dose  other medicines as needed   9. Will follow     .      Elian Quevedo MD, MD

## 2021-12-28 NOTE — DISCHARGE SUMMARY
Hospital Discharge Summary    Patient:  Marsha Osborne  YOB: 1965    MRN: [id number]   Acct: [de-identified]    Primary Care Physician: Casie Rojas MD    Admit date:  12/24/2021    Discharge date:  12/28/2021      Discharge Diagnoses:   Hypoglycemia  Principal Problem:    Hypoglycemia  Active Problems:    Chronic kidney disease, stage V (HCC)    COVID-19    Anemia due to chronic kidney disease    ANA MARÍA (obstructive sleep apnea)    Hypersomnia    Obesity (BMI 30-39. 9)    Hyponatremia  Resolved Problems:    * No resolved hospital problems. *        Admitted for: (HPI)Brian Corinda Phoenix is a 49 y.o.  male with PMH significant for ESRD-to begin peritoneal dialysis soon he is also on transplant list at [address] [place] [address], CAD status post CABG January 2021, CHF, T2DM, GERD, gout, hypertension, hyperlipidemia, ANA MARÍA, PAD presented to ED for hypoglycemic episode at home. Patient states that he has been generally weak for the last 2 days with decreased appetite and several episodes of vomiting. Patient states he did have a couple of episodes of low blood sugar yesterday at home. Patient denies any abdominal or back pain. Patient denies fever/chills. Patient denies any dizziness or headache. Patient denies any diarrhea or constipation. Patient denies any chest pain or shortness of breath. Patient denies any other recent illnesses or hospitalizations.     Ten point ROS reviewed negative, unless as noted above    Hospital Course: Patient was admitted and treated as follows     1. Hypoglycemia   1. Patient with diabetes on home Lantus 10 units daily and glipizide 10 mg twice daily ,came in with hypoglycemia~BS dropped to 40. All diabetic meds were held . he was treated with D10 as well as Sandostatin subcu . Blood sugars slowly improved. Oral intake also improved.   Patient was followed by endocrinology while hospitalized and he recommends to decrease glipizide from 10 mg twice daily to 2.5 twice daily, to be taken only if blood sugar more than 150.     2.    Essential hypertension         1. Continue home nifedipine XL and amlodipine. BP still not at this goal with mild tachycardia so Lopressor 25 twice daily was added. He will follow with his PCP within a week.     3. CKD/ESRD        1. In process of getting on [facility] transplant list        2. Has catheter for peritoneal dialysis but is not on dialysis yet        3. Nephrologist is at [facility] nephrology        4. Was followed by nephrology here and treated with sodium bicarb for metabolic acidosis. Bicarb at discharge is 11 and he will be discharged with sodium bicarb 1300 mg twice daily for 5 days. Nephrology recommends that he follow with his own nephrologist for further eval and meds. BUN is 74 and creatinine 7.1.     4. Hyponatremia        1. 2/2 CKD        2. NA (12/25) 125----->  126 at discharge. Potassium normal at 4.7. Magnesium 1.5 and was replaced before discharge. 5.  Anemia of chronic kidney disease  Slightly improved to 8.3 at discharge.     5. Covid 19         1. Is vaccinated~tested positive on 12/25         2. CXR (12/25) shows no acute pulmonary disease         3. Respiratory panel -Covid positive by PCR        4.   Did not require oxygen and was assessed for home oxygen at discharge but did not qualify as O2 sats stayed above 94 with ambulation.     Consultants: Nephrology, endocrinology    Discharge Medications:       Medication List      START taking these medications    glipiZIDE 5 MG tablet  Commonly known as: GLUCOTROL  Take 0.5 tablets by mouth 2 times daily     metoprolol tartrate 50 MG tablet  Commonly known as: LOPRESSOR  Take 0.5 tablets by mouth 2 times daily     sodium bicarbonate 650 MG tablet  Take 2 tablets by mouth 3 times daily for 5 days        CONTINUE taking these medications    acetaminophen 325 MG tablet  Commonly known as: TYLENOL     allopurinol 100 MG tablet  Commonly known as: ZYLOPRIM     amLODIPine 5 MG tablet  Commonly known as: NORVASC     aspirin 81 MG tablet     atorvastatin 40 MG tablet  Commonly known as: LIPITOR     buPROPion 150 MG extended release tablet  Commonly known as: WELLBUTRIN SR     clopidogrel 75 MG tablet  Commonly known as: PLAVIX     Dulaglutide 1.5 MG/0.5ML Sopn     FreeStyle Lancets Misc     Lantus 100 UNIT/ML injection vial  Generic drug: insulin glargine     NIFEdipine 60 MG extended release tablet  Commonly known as: ADALAT CC     omeprazole 40 MG delayed release capsule  Commonly known as: PRILOSEC     Xarelto 2.5 MG Tabs tablet  Generic drug: rivaroxaban           Where to Get Your Medications      You can get these medications from any pharmacy    Bring a paper prescription for each of these medications  · glipiZIDE 5 MG tablet  · metoprolol tartrate 50 MG tablet  · sodium bicarbonate 650 MG tablet       Physical Exam:    Vitals:  Vitals:    12/28/21 0628 12/28/21 0800 12/28/21 0808 12/28/21 1145   BP:   (!) 159/86 (!) 143/95   Pulse: 98 117 106 108   Resp: 17 19 15 13   Temp:   97.5 °F (36.4 °C)    TempSrc:   Oral    SpO2: 96% 96% 95% 97%     Weight:       24 hour intake/output:    Intake/Output Summary (Last 24 hours) at 12/28/2021 1349  Last data filed at 12/28/2021 0800  Gross per 24 hour   Intake 10 ml   Output 2200 ml   Net -2190 ml       General appearance - alert, well appearing, and in no distress, obese  Chest -diminished but clear  Heart -S1-S2 regular,mildly tachycardic  Abdomen - soft, non tender, non distended, no masses or organomegaly  Obese: Yes; Protuberant: No   Neurological - alert, oriented, normal speech, no focal findings or movement disorder noted  Extremities - peripheral pulses normal, no pedal edema, no clubbing or cyanosis  Skin - normal coloration and turgor, no rashes, no suspicious skin lesions noted    Procedures:      Diagnostic Test:      Radiology reports as per the Radiologist  Radiology:   XR CHEST PORTABLE  Result Date: 12/25/2021  EXAMINATION: ONE XRAY VIEW OF THE CHEST 12/25/2021 6:58 am COMPARISON: 04/19/2018 HISTORY: ORDERING SYSTEM PROVIDED HISTORY: wheezing/cough TECHNOLOGIST PROVIDED HISTORY: Reason for exam:->wheezing/cough Reason for Exam: hypoglycemia, cough FINDINGS: Sternal wires are in place. The heart and mediastinal structures are stable. The pulmonary vasculature is normal.  Lungs are clear. No acute cardiopulmonary disease.         Results for orders placed or performed during the hospital encounter of 12/24/21   COVID-19, Rapid    Specimen: Nasopharyngeal   Result Value Ref Range    Source UNKNOWN     SARS-CoV-2, NAAT DETECTED (A) NOT DETECTED   Respiratory Panel, Molecular, with COVID-19 (Restricted: peds pts or suitable admitted adults)    Specimen: Nasopharyngeal   Result Value Ref Range    Adenovirus Detection by PCR NOT DETECTED NOT DETECTED    Coronavirus 229E PCR NOT DETECTED NOT DETECTED    Coronavirus HKU1 PCR NOT DETECTED NOT DETECTED    Coronavirus NL63 PCR NOT DETECTED NOT DETECTED    Coronavirus OC43 PCR NOT DETECTED NOT DETECTED    SARS-CoV-2 DETECTED BY PCR (A) NOT DETECTED    Human Metapneumovirus PCR NOT DETECTED NOT DETECTED    Rhinovirus Enterovirus PCR NOT DETECTED NOT DETECTED    Influenza A by PCR NOT DETECTED NOT DETECTED    Influenza A H1 Pandemic PCR NOT DETECTED NOT DETECTED    Influenza A H1 (2009) PCR NOT DETECTED NOT DETECTED    Influenza A H3 PCR NOT DETECTED NOT DETECTED    Influenza B by PCR NOT DETECTED NOT DETECTED    Parainfluenza 1 PCR NOT DETECTED NOT DETECTED    Parainfluenza 2 PCR NOT DETECTED NOT DETECTED    Parainfluenza 3 PCR NOT DETECTED NOT DETECTED    Parainfluenza 4 PCR NOT DETECTED NOT DETECTED    RSV PCR NOT DETECTED NOT DETECTED    Bordetella parapertussis by PCR NOT DETECTED NOT DETECTED    B Pertussis by PCR NOT DETECTED NOT DETECTED    Chlamydophila Pneumonia PCR NOT DETECTED NOT DETECTED    Mycoplasma pneumo by PCR NOT DETECTED NOT DETECTED   Comprehensive Metabolic Panel w/ Reflex to MG Result Value Ref Range    Sodium 125 (L) 135 - 145 MMOL/L    Potassium 4.6 3.5 - 5.1 MMOL/L    Chloride 99 99 - 110 mMol/L    CO2 11 (L) 21 - 32 MMOL/L    BUN 68 (H) 6 - 23 MG/DL    CREATININE 7.5 (H) 0.9 - 1.3 MG/DL    Glucose 40 (LL) 70 - 99 MG/DL    Calcium 7.4 (L) 8.3 - 10.6 MG/DL    Albumin 2.8 (L) 3.4 - 5.0 GM/DL    Total Protein 6.6 6.4 - 8.2 GM/DL    Total Bilirubin 0.2 0.0 - 1.0 MG/DL    ALT 12 10 - 40 U/L    AST 13 (L) 15 - 37 IU/L    Alkaline Phosphatase 66 40 - 129 IU/L    GFR Non- 8 (L) >60 mL/min/1.73m2    GFR  9 (L) >60 mL/min/1.73m2    Anion Gap 15 4 - 16   CBC Auto Differential   Result Value Ref Range    WBC 6.2 4.0 - 10.5 K/CU MM    RBC 3.12 (L) 4.6 - 6.2 M/CU MM    Hemoglobin 8.0 (L) 13.5 - 18.0 GM/DL    Hematocrit 25.9 (L) 42 - 52 %    MCV 83.0 78 - 100 FL    MCH 25.6 (L) 27 - 31 PG    MCHC 30.9 (L) 32.0 - 36.0 %    RDW 15.3 (H) 11.7 - 14.9 %    Platelets 054 352 - 491 K/CU MM    MPV 10.2 7.5 - 11.1 FL    Differential Type AUTOMATED DIFFERENTIAL     Segs Relative 70.8 (H) 36 - 66 %    Lymphocytes % 19.5 (L) 24 - 44 %    Monocytes % 8.2 (H) 0 - 4 %    Eosinophils % 0.8 0 - 3 %    Basophils % 0.2 0 - 1 %    Segs Absolute 4.4 K/CU MM    Lymphocytes Absolute 1.2 K/CU MM    Monocytes Absolute 0.5 K/CU MM    Eosinophils Absolute 0.1 K/CU MM    Basophils Absolute 0.0 K/CU MM    Nucleated RBC % 0.0 %    Total Nucleated RBC 0.0 K/CU MM    Total Immature Neutrophil 0.03 K/CU MM    Immature Neutrophil % 0.5 (H) 0 - 0.43 %   Basic Metabolic Panel w/ Reflex to MG   Result Value Ref Range    Sodium 122 (L) 135 - 145 MMOL/L    Potassium 5.2 (H) 3.5 - 5.1 MMOL/L    Chloride 96 (L) 99 - 110 mMol/L    CO2 12 (L) 21 - 32 MMOL/L    Anion Gap 14 4 - 16    BUN 69 (H) 6 - 23 MG/DL    CREATININE 7.2 (H) 0.9 - 1.3 MG/DL    Glucose 165 (H) 70 - 99 MG/DL    Calcium 6.9 (LL) 8.3 - 10.6 MG/DL    GFR Non- 8 (L) >60 mL/min/1.73m2    GFR  10 (L) >60 mL/min/1.73m2   CBC   Result Value Ref Range    WBC 4.5 4.0 - 10.5 K/CU MM    RBC 2.80 (L) 4.6 - 6.2 M/CU MM    Hemoglobin 7.3 (L) 13.5 - 18.0 GM/DL    Hematocrit 23.0 (L) 42 - 52 %    MCV 82.1 78 - 100 FL    MCH 26.1 (L) 27 - 31 PG    MCHC 31.7 (L) 32.0 - 36.0 %    RDW 14.9 11.7 - 14.9 %    Platelets 955 216 - 611 K/CU MM    MPV 10.9 7.5 - 11.1 FL   Hemoglobin A1c   Result Value Ref Range    Hemoglobin A1C 6.3 4.2 - 6.3 %    eAG 134 mg/dL   Calcium, ionized   Result Value Ref Range    Ionized Ca 0.96 (L) 1.12 - 1.32 mMOL/L    Calcium, Ion 3.84 (L) 4.48 - 5.28 MG/DL   Basic metabolic panel   Result Value Ref Range    Sodium 121 (L) 135 - 145 MMOL/L    Potassium 4.6 3.5 - 5.1 MMOL/L    Chloride 94 (L) 99 - 110 mMol/L    CO2 12 (L) 21 - 32 MMOL/L    Anion Gap 15 4 - 16    BUN 72 (H) 6 - 23 MG/DL    CREATININE 7.3 (H) 0.9 - 1.3 MG/DL    Glucose 136 (H) 70 - 99 MG/DL    Calcium 7.7 (L) 8.3 - 10.6 MG/DL    GFR Non- 8 (L) >60 mL/min/1.73m2    GFR  10 (L) >60 mL/min/1.73m2   Lactate, Sepsis   Result Value Ref Range    Lactic Acid, Sepsis 0.6 0.5 - 1.9 mMOL/L   D-dimer, quantitative   Result Value Ref Range    D-Dimer, Quant 694 (H) <230 NG/mL(DDU)   Critical Care Panel   Result Value Ref Range    Sodium 126 (L) 135 - 145 MMOL/L    Potassium 4.7 3.5 - 5.1 MMOL/L    Chloride 96 (L) 99 - 110 mMol/L    CO2 11 (L) 21 - 32 MMOL/L    Anion Gap 19 (H) 4 - 16    BUN 74 (H) 6 - 23 MG/DL    CREATININE 7.1 (H) 0.9 - 1.3 MG/DL    Glucose 104 (H) 70 - 99 MG/DL    Calcium 7.6 (L) 8.3 - 10.6 MG/DL    GFR Non- 8 (L) >60 mL/min/1.73m2    GFR  10 (L) >60 mL/min/1.73m2    Phosphorus 6.7 (H) 2.5 - 4.9 MG/DL    Magnesium 1.5 (L) 1.8 - 2.4 mg/dl   Hemoglobin and hematocrit, blood   Result Value Ref Range    Hemoglobin 8.3 (L) 13.5 - 18.0 GM/DL    Hematocrit 26.4 (L) 42 - 52 %   Vitamin D 25 Hydroxy   Result Value Ref Range    Vit D, 25-Hydroxy 12.61 (L) >20 NG/ML   C-Reactive Protein   Result Value Ref Range    CRP, High Sensitivity 7.6 mg/L   POCT Glucose   Result Value Ref Range    POC Glucose 100 (H) 70 - 99 MG/DL   POCT Glucose   Result Value Ref Range    POC Glucose 82 70 - 99 MG/DL   POCT Glucose   Result Value Ref Range    POC Glucose 123 (H) 70 - 99 MG/DL   POCT Glucose   Result Value Ref Range    POC Glucose 103 (H) 70 - 99 MG/DL   POCT Glucose   Result Value Ref Range    POC Glucose 92 70 - 99 MG/DL   POCT Glucose   Result Value Ref Range    POC Glucose 51 (L) 70 - 99 MG/DL   POCT Glucose   Result Value Ref Range    POC Glucose 102 (H) 70 - 99 MG/DL   POCT Glucose   Result Value Ref Range    POC Glucose 104 (H) 70 - 99 MG/DL   POCT Glucose   Result Value Ref Range    POC Glucose 120 (H) 70 - 99 MG/DL   POCT Glucose   Result Value Ref Range    POC Glucose 173 (H) 70 - 99 MG/DL   POCT glucose   Result Value Ref Range    Glucose 232 mg/dL    QC OK?  yes    POCT glucose   Result Value Ref Range    Glucose 127 mg/dL    QC OK? y    POCT Glucose   Result Value Ref Range    POC Glucose 232 (H) 70 - 99 MG/DL   POCT Glucose   Result Value Ref Range    POC Glucose 176 (H) 70 - 99 MG/DL   POCT Glucose   Result Value Ref Range    POC Glucose 127 (H) 70 - 99 MG/DL   POCT glucose   Result Value Ref Range    Glucose 160 mg/dL   POCT Glucose   Result Value Ref Range    POC Glucose 160 (H) 70 - 99 MG/DL   POCT Glucose   Result Value Ref Range    POC Glucose 142 (H) 70 - 99 MG/DL   POCT Glucose   Result Value Ref Range    POC Glucose 101 (H) 70 - 99 MG/DL   POCT Glucose   Result Value Ref Range    POC Glucose 40 (L) 70 - 99 MG/DL   POCT Glucose   Result Value Ref Range    POC Glucose 36 (L) 70 - 99 MG/DL   POCT Glucose   Result Value Ref Range    POC Glucose 41 (L) 70 - 99 MG/DL   POCT Glucose   Result Value Ref Range    POC Glucose 136 (H) 70 - 99 MG/DL   POCT Glucose   Result Value Ref Range    POC Glucose 298 (H) 70 - 99 MG/DL   POCT Glucose   Result Value Ref Range    POC Glucose 369 (H) 70 - 99 MG/DL   POCT Glucose   Result Value Ref Range    POC Glucose 107 (H) 70 - 99 MG/DL   POCT Glucose   Result Value Ref Range    POC Glucose 134 (H) 70 - 99 MG/DL       Diet:  ADULT DIET; Regular; Low Fat/Low Chol/High Fiber/PETR; Low Sodium (2 gm); Low Potassium (Less than 3000 mg/day);  Low Phosphorus (Less than 1000 mg)    Activity:  Up ad candace (Patient can move independently)    Follow-up:  in 1 week with Crystal Zacarias MD,     Disposition: home    Condition: Stable      Time Spent: 35 minutes    Electronically signed by Hilario Deras MD on 12/28/2021 at 1:49 PM    Discharging Hospitalist

## 2021-12-29 ENCOUNTER — CARE COORDINATION (OUTPATIENT)
Dept: CASE MANAGEMENT | Age: 56
End: 2021-12-29

## 2021-12-29 DIAGNOSIS — E16.2 HYPOGLYCEMIA: Primary | ICD-10-CM

## 2021-12-29 NOTE — CARE COORDINATION
Patient contacted regarding COVID-19 diagnosis. Discussed COVID-19 related testing which was available at this time. Test results were positive, 2021  Patient informed of results, if available? Yes. Care Transition Nurse contacted the patient by telephone to perform post discharge assessment. Call within 2 business days of discharge: Yes. Verified name and  with patient as identifiers. Provided introduction to self, and explanation of the CTN role, and reason for call due to risk factors for infection and/or exposure to COVID-19. Verna Figueroa  Reports    Symptoms reviewed with patient who verbalized the following symptoms: no new symptoms and no worsening symptoms. Due to no new or worsening symptoms encounter was not routed to provider for escalation. Discussed follow-up appointments. If no appointment was previously scheduled, appointment scheduling offered: Yes. Appt already scheduled 2021 with PCP  [facility] follow up appointment(s):   Future Appointments   Date Time Provider Viktor Kim   2022 11:45 AM [facility]kins [facility]     [facility] follow up appointment(s):  2021 Dr Ino Hernandes services provided:  Scheduled appointment with PCP--  Education of patient/family/caregiver/guardian to support self-management--  Assessment and support for treatment adherence and medication management--     Advance Care Planning:   Does patient have an Advance Directive:  not on file; education provided. Educated patient about risk for severe COVID-19 due to risk factors according to CDC guidelines. CTN reviewed discharge instructions, medical action plan and red flag symptoms with the patient who verbalized understanding. Discussed COVID vaccination status: Yes. Education provided on COVID-19 vaccination as appropriate. Discussed exposure protocols and quarantine with CDC Guidelines.  Patient was given an opportunity to verbalize any questions and concerns and agrees to contact CTN or health care provider for questions related to their healthcare. Reviewed and educated patient on any new and changed medications related to discharge diagnosis     Was patient discharged with a pulse oximeter? No Discussed and confirmed pulse oximeter discharge instructions and when to notify provider or seek emergency care. CTN provided contact information. Plan for follow-up call in 5-7 days based on severity of symptoms and risk factors.     Sudeep Mcmullen RN CTN

## 2022-01-05 ENCOUNTER — HOSPITAL ENCOUNTER (OUTPATIENT)
Age: 57
Setting detail: SPECIMEN
Discharge: HOME | End: 2022-01-05
Payer: COMMERCIAL

## 2022-01-05 LAB
ANION GAP SERPL CALCULATED.3IONS-SCNC: 15 MMOL/L (ref 4–16)
BASOPHILS ABSOLUTE: 0 K/CU MM
BASOPHILS RELATIVE PERCENT: 0.6 % (ref 0–1)
BUN BLDV-MCNC: 69 MG/DL (ref 6–23)
CALCIUM SERPL-MCNC: 8.2 MG/DL (ref 8.3–10.6)
CHLORIDE BLD-SCNC: 100 MMOL/L (ref 99–110)
CO2: 15 MMOL/L (ref 21–32)
CREAT SERPL-MCNC: 7.8 MG/DL (ref 0.9–1.3)
DIFFERENTIAL TYPE: ABNORMAL
EOSINOPHILS ABSOLUTE: 0.1 K/CU MM
EOSINOPHILS RELATIVE PERCENT: 2.7 % (ref 0–3)
GFR AFRICAN AMERICAN: 9 ML/MIN/1.73M2
GFR NON-AFRICAN AMERICAN: 7 ML/MIN/1.73M2
GLUCOSE BLD-MCNC: 112 MG/DL (ref 70–99)
HCT VFR BLD CALC: 22.8 % (ref 42–52)
HEMOGLOBIN: 7.3 GM/DL (ref 13.5–18)
IMMATURE NEUTROPHIL %: 0.6 % (ref 0–0.43)
LYMPHOCYTES ABSOLUTE: 1.6 K/CU MM
LYMPHOCYTES RELATIVE PERCENT: 31.1 % (ref 24–44)
MCH RBC QN AUTO: 25.6 PG (ref 27–31)
MCHC RBC AUTO-ENTMCNC: 32 % (ref 32–36)
MCV RBC AUTO: 80 FL (ref 78–100)
MONOCYTES ABSOLUTE: 0.6 K/CU MM
MONOCYTES RELATIVE PERCENT: 11.7 % (ref 0–4)
NUCLEATED RBC %: 0 %
PDW BLD-RTO: 15.1 % (ref 11.7–14.9)
PLATELET # BLD: 345 K/CU MM (ref 140–440)
PMV BLD AUTO: 10.3 FL (ref 7.5–11.1)
POTASSIUM SERPL-SCNC: 5.1 MMOL/L (ref 3.5–5.1)
RBC # BLD: 2.85 M/CU MM (ref 4.6–6.2)
SEGMENTED NEUTROPHILS ABSOLUTE COUNT: 2.7 K/CU MM
SEGMENTED NEUTROPHILS RELATIVE PERCENT: 53.3 % (ref 36–66)
SODIUM BLD-SCNC: 130 MMOL/L (ref 135–145)
TOTAL IMMATURE NEUTOROPHIL: 0.03 K/CU MM
TOTAL NUCLEATED RBC: 0 K/CU MM
WBC # BLD: 5.1 K/CU MM (ref 4–10.5)

## 2022-01-05 PROCEDURE — 80048 BASIC METABOLIC PNL TOTAL CA: CPT

## 2022-01-05 PROCEDURE — 85025 COMPLETE CBC W/AUTO DIFF WBC: CPT

## 2022-01-11 ENCOUNTER — CARE COORDINATION (OUTPATIENT)
Dept: CASE MANAGEMENT | Age: 57
End: 2022-01-11

## 2022-01-11 NOTE — CARE COORDINATION
Care Transitions Outreach Attempt     Subsequent Call    Attempted to reach patient for transitions of care follow up. HIPPA compliant voice mail message left with requrest for return call. Unable to reach patient. CTN to reattempt    Patient: Haja Martinez Patient : 1965 MRN: <[id number]>    Last Discharge [address] [address]       Complaint Diagnosis Description Type Department Provider    21 Hypoglycemia Hypoglycemia . .. ED to Hosp-Admission (Discharged) (ADMITTED) [facility] [address] Floyd Lyman MD; Jb Safnord. .. Was this an external facility discharge?  No Discharge Facility: [facility]    Noted following upcoming appointments from discharge chart review:   [facility] follow up appointment(s):   Future Appointments   Date Time Provider Viktor iKm   2022 11:45 AM Jana Lees MD [address] [place] [address]     Non-[facility] follow up appointment(s):     Christel Mayorga RN CTN

## 2022-01-20 ENCOUNTER — CARE COORDINATION (OUTPATIENT)
Dept: CASE MANAGEMENT | Age: 57
End: 2022-01-20

## 2022-01-26 ENCOUNTER — TELEPHONE (OUTPATIENT)
Dept: PULMONOLOGY | Age: 57
End: 2022-01-26

## 2023-09-15 ENCOUNTER — HOSPITAL ENCOUNTER (OUTPATIENT)
Age: 58
Setting detail: SPECIMEN
Discharge: HOME | End: 2023-09-15

## 2023-09-15 LAB
ALBUMIN SERPL-MCNC: 4.6 GM/DL (ref 3.4–5)
ALP BLD-CCNC: 70 IU/L (ref 40–129)
ALT SERPL-CCNC: 10 U/L (ref 10–40)
ANION GAP SERPL CALCULATED.3IONS-SCNC: 15 MMOL/L (ref 4–16)
AST SERPL-CCNC: 11 IU/L (ref 15–37)
BASOPHILS ABSOLUTE: 0 K/CU MM
BASOPHILS RELATIVE PERCENT: 0.7 % (ref 0–1)
BILIRUB SERPL-MCNC: 0.7 MG/DL (ref 0–1)
BUN SERPL-MCNC: 71 MG/DL (ref 6–23)
CALCIUM SERPL-MCNC: 9.3 MG/DL (ref 8.3–10.6)
CHLORIDE BLD-SCNC: 93 MMOL/L (ref 99–110)
CO2: 27 MMOL/L (ref 21–32)
CREAT SERPL-MCNC: 9.1 MG/DL (ref 0.9–1.3)
DIFFERENTIAL TYPE: ABNORMAL
EOSINOPHILS ABSOLUTE: 0.1 K/CU MM
EOSINOPHILS RELATIVE PERCENT: 3 % (ref 0–3)
GFR SERPL CREATININE-BSD FRML MDRD: 6 ML/MIN/1.73M2
GLUCOSE SERPL-MCNC: 205 MG/DL (ref 70–99)
HCT VFR BLD CALC: 38.1 % (ref 42–52)
HEMOGLOBIN: 11.9 GM/DL (ref 13.5–18)
IMMATURE NEUTROPHIL %: 0 % (ref 0–0.43)
LYMPHOCYTES ABSOLUTE: 1.6 K/CU MM
LYMPHOCYTES RELATIVE PERCENT: 35 % (ref 24–44)
MCH RBC QN AUTO: 25.1 PG (ref 27–31)
MCHC RBC AUTO-ENTMCNC: 31.2 % (ref 32–36)
MCV RBC AUTO: 80.4 FL (ref 78–100)
MONOCYTES ABSOLUTE: 0.8 K/CU MM
MONOCYTES RELATIVE PERCENT: 16.7 % (ref 0–4)
NUCLEATED RBC %: 0 %
PDW BLD-RTO: 19.7 % (ref 11.7–14.9)
PHOSPHORUS: 5.3 MG/DL (ref 2.5–4.9)
PLATELET # BLD: 146 K/CU MM (ref 140–440)
PMV BLD AUTO: 10 FL (ref 7.5–11.1)
POTASSIUM SERPL-SCNC: 4 MMOL/L (ref 3.5–5.1)
RBC # BLD: 4.74 M/CU MM (ref 4.6–6.2)
SEGMENTED NEUTROPHILS ABSOLUTE COUNT: 2.1 K/CU MM
SEGMENTED NEUTROPHILS RELATIVE PERCENT: 44.6 % (ref 36–66)
SODIUM BLD-SCNC: 135 MMOL/L (ref 135–145)
TOTAL IMMATURE NEUTOROPHIL: 0 K/CU MM
TOTAL NUCLEATED RBC: 0 K/CU MM
TOTAL PROTEIN: 7.1 GM/DL (ref 6.4–8.2)
WBC # BLD: 4.6 K/CU MM (ref 4–10.5)

## 2023-09-15 PROCEDURE — 84100 ASSAY OF PHOSPHORUS: CPT

## 2023-09-15 PROCEDURE — 80053 COMPREHEN METABOLIC PANEL: CPT

## 2023-09-15 PROCEDURE — 85025 COMPLETE CBC W/AUTO DIFF WBC: CPT

## 2024-07-22 ENCOUNTER — HOSPITAL ENCOUNTER (EMERGENCY)
Age: 59
Discharge: HOME | End: 2024-07-22
Payer: MEDICARE

## 2024-07-22 VITALS
SYSTOLIC BLOOD PRESSURE: 109 MMHG | OXYGEN SATURATION: 99 % | RESPIRATION RATE: 20 BRPM | DIASTOLIC BLOOD PRESSURE: 62 MMHG | TEMPERATURE: 97.7 F | HEART RATE: 60 BPM

## 2024-07-22 DIAGNOSIS — N18.6 ESRD (END STAGE RENAL DISEASE) (HCC): Primary | ICD-10-CM

## 2024-07-22 DIAGNOSIS — E87.5 HYPERKALEMIA: ICD-10-CM

## 2024-07-22 LAB
ALBUMIN SERPL-MCNC: 4.1 GM/DL (ref 3.4–5)
ALP BLD-CCNC: 62 IU/L (ref 40–128)
ALT SERPL-CCNC: 7 U/L (ref 10–40)
ANION GAP SERPL CALCULATED.3IONS-SCNC: 17 MMOL/L (ref 7–16)
AST SERPL-CCNC: 9 IU/L (ref 15–37)
BASOPHILS ABSOLUTE: 0 K/CU MM
BASOPHILS RELATIVE PERCENT: 0.5 % (ref 0–1)
BILIRUB SERPL-MCNC: 1.1 MG/DL (ref 0–1)
BUN SERPL-MCNC: 115 MG/DL (ref 6–23)
CALCIUM SERPL-MCNC: 10.9 MG/DL (ref 8.3–10.6)
CHLORIDE BLD-SCNC: 97 MMOL/L (ref 99–110)
CO2: 21 MMOL/L (ref 21–32)
CREAT SERPL-MCNC: 14.1 MG/DL (ref 0.9–1.3)
DIFFERENTIAL TYPE: ABNORMAL
EOSINOPHILS ABSOLUTE: 0.1 K/CU MM
EOSINOPHILS RELATIVE PERCENT: 2 % (ref 0–3)
GFR, ESTIMATED: 4 ML/MIN/1.73M2
GLUCOSE SERPL-MCNC: 126 MG/DL (ref 70–99)
HCT VFR BLD CALC: 29.2 % (ref 42–52)
HEMOGLOBIN: 9.6 GM/DL (ref 13.5–18)
IMMATURE NEUTROPHIL %: 0.3 % (ref 0–0.43)
LYMPHOCYTES ABSOLUTE: 1.6 K/CU MM
LYMPHOCYTES RELATIVE PERCENT: 24.6 % (ref 24–44)
MCH RBC QN AUTO: 28.3 PG (ref 27–31)
MCHC RBC AUTO-ENTMCNC: 32.9 % (ref 32–36)
MCV RBC AUTO: 86.1 FL (ref 78–100)
MONOCYTES ABSOLUTE: 0.7 K/CU MM
MONOCYTES RELATIVE PERCENT: 10.9 % (ref 0–4)
NEUTROPHILS ABSOLUTE: 4.1 K/CU MM
NEUTROPHILS RELATIVE PERCENT: 61.7 % (ref 36–66)
NUCLEATED RBC %: 0 %
PDW BLD-RTO: 15.3 % (ref 11.7–14.9)
PLATELET # BLD: 182 K/CU MM (ref 140–440)
PMV BLD AUTO: 10.2 FL (ref 7.5–11.1)
POTASSIUM SERPL-SCNC: 5.4 MMOL/L (ref 3.5–5.1)
RBC # BLD: 3.39 M/CU MM (ref 4.6–6.2)
SODIUM BLD-SCNC: 135 MMOL/L (ref 135–145)
TOTAL IMMATURE NEUTOROPHIL: 0.02 K/CU MM
TOTAL NUCLEATED RBC: 0 K/CU MM
TOTAL PROTEIN: 8 GM/DL (ref 6.4–8.2)
WBC # BLD: 6.6 K/CU MM (ref 4–10.5)

## 2024-07-22 PROCEDURE — 80053 COMPREHEN METABOLIC PANEL: CPT

## 2024-07-22 PROCEDURE — 85025 COMPLETE CBC W/AUTO DIFF WBC: CPT

## 2024-07-22 PROCEDURE — 99283 EMERGENCY DEPT VISIT LOW MDM: CPT

## 2024-07-22 NOTE — DISCHARGE INSTRUCTIONS
Please call your nephrologist office to discuss your visit to the emergency department, as well as the results of your lab work and show mildly elevated potassium.    Please continue with your home dialys as discussed with you.    Return to emergency department if you are unable to do this, or return with any confusion, abdominal pain, weakness, worsening symptoms or any new concerns.

## 2024-07-22 NOTE — ED PROVIDER NOTES
**ADVANCED PRACTICE PROVIDER, I HAVE EVALUATED THIS PATIENT**        [facility] EMERGENCY DEPARTMENT  EMERGENCY DEPARTMENT ENCOUNTER      Pt Name: Victor Hugo Coughlin  MRN:[id number]  Birthdate 1965  Date of evaluation: 7/22/2024  Provider: Jimbo Salas PA-C      Chief Complaint:    Chief Complaint   Patient presents with    missed dialysis     X two treatments due to his machine at home being broke          Nursing Notes, Past Medical Hx, Past Surgical Hx, Social Hx, Allergies, and Family Hx were all reviewed and agreed with or any disagreements were addressed in the HPI.    HISTORY OF PRESENT ILLNESS     History from : Patient    Limitations to history : None    Victor Hugo Coughlin is a 58 y.o. male who presents mentions has been unable to get his home dialysis.  Mentions a history of end-stage renal disease, is on home hemodialysis, utilizes a fistula.  Mentions he typically tries his own blood work and sensitive order has had machines that he will dialyze himself each day.  Discussion he is on a transplant list at [facility].   And since his nephrologist is here locally.  He states his last dialysis treatment was approximately 3 days ago, mentioning he was detention through it when the machine had stopped working and malfunction.  Apparently he had discussed this with his nephrology team, who advised he come here for evaluation and likely dialysis.  He otherwise is denying any complaints, shortness of breath, abdominal pain, confusion of bowel or bladder symptoms    PastMedical/Surgical History:      Diagnosis Date    Diabetes mellitus (HCC)     Peritoneal dialysis catheter in place (HCC)      No past surgical history on file.    Medications:  Discharge Medication List as of 7/22/2024  3:43 PM        CONTINUE these medications which have NOT CHANGED    Details   metoprolol tartrate (LOPRESSOR) 50 MG tablet Take 0.5 tablets by mouth 2 times daily, Disp-60

## 2024-07-22 NOTE — ED NOTES
Pt stating lab work was last down about a week ago. States they have their blood taken monthly. This RN educated pt on the importance of recent lab work prior to dialysis. Britton MONTALVO stating he will talk with pt.

## 2024-07-22 NOTE — ED TRIAGE NOTES
Pt arrived with c/o missed dialysis. States he gets dialysis 4x a week. His machine broke on Friday they were supposed to send him a machine on Saturday but did not. he has missed two days of dialysis now and was sen here. Pt does have a productive cough but no other complaints. Pt a/o.

## 2024-07-22 NOTE — ED NOTES
Pt refusing blood work. Stating he is here for dialysis and just had blood taken. This RN educated pt on the purpose of the lab work and how the process works as far as getting dialysis goes. Pt stating we can call the nurse at his PC to get his results. Britton MONTALVO notified.

## 2024-08-01 ENCOUNTER — HOSPITAL ENCOUNTER (OUTPATIENT)
Age: 59
Discharge: HOME | End: 2024-08-01

## 2024-08-01 ENCOUNTER — HOSPITAL ENCOUNTER (OUTPATIENT)
Dept: GENERAL RADIOLOGY | Age: 59
Discharge: HOME | End: 2024-08-01

## 2024-08-01 DIAGNOSIS — Z11.1 TUBERCULOSIS SCREENING: ICD-10-CM

## 2024-08-01 PROCEDURE — 71046 X-RAY EXAM CHEST 2 VIEWS: CPT

## 2024-11-07 ENCOUNTER — HOSPITAL ENCOUNTER (EMERGENCY)
Age: 59
Discharge: HOME | End: 2024-11-07
Payer: COMMERCIAL

## 2024-11-07 ENCOUNTER — APPOINTMENT (OUTPATIENT)
Dept: CT IMAGING | Age: 59
End: 2024-11-07
Payer: COMMERCIAL

## 2024-11-07 VITALS
TEMPERATURE: 98 F | SYSTOLIC BLOOD PRESSURE: 156 MMHG | RESPIRATION RATE: 19 BRPM | OXYGEN SATURATION: 94 % | HEART RATE: 95 BPM | DIASTOLIC BLOOD PRESSURE: 92 MMHG

## 2024-11-07 DIAGNOSIS — I51.7 CARDIOMEGALY: ICD-10-CM

## 2024-11-07 DIAGNOSIS — R33.9 URINARY RETENTION: Primary | ICD-10-CM

## 2024-11-07 DIAGNOSIS — R16.0 HEPATOMEGALY: ICD-10-CM

## 2024-11-07 DIAGNOSIS — N18.6 ESRD (END STAGE RENAL DISEASE) (HCC): ICD-10-CM

## 2024-11-07 DIAGNOSIS — K80.80 BILIARY CALCULUS OF OTHER SITE WITHOUT OBSTRUCTION: ICD-10-CM

## 2024-11-07 DIAGNOSIS — R91.1 PULMONARY NODULE: ICD-10-CM

## 2024-11-07 LAB
ANION GAP SERPL CALCULATED.3IONS-SCNC: 21 MMOL/L (ref 9–17)
BASOPHILS # BLD: 0.04 K/UL
BASOPHILS NFR BLD: 1 % (ref 0–1)
BILIRUB UR QL STRIP: NEGATIVE
BUN SERPL-MCNC: 66 MG/DL (ref 7–20)
CALCIUM SERPL-MCNC: 9 MG/DL (ref 8.3–10.6)
CHLORIDE SERPL-SCNC: 91 MMOL/L (ref 99–110)
CLARITY UR: CLEAR
CO2 SERPL-SCNC: 24 MMOL/L (ref 21–32)
COLOR UR: YELLOW
CREAT SERPL-MCNC: 9.4 MG/DL (ref 0.9–1.3)
EOSINOPHIL # BLD: 0.1 K/UL
EOSINOPHILS RELATIVE PERCENT: 2 % (ref 0–3)
EPI CELLS #/AREA URNS HPF: 6 /HPF
ERYTHROCYTE [DISTWIDTH] IN BLOOD BY AUTOMATED COUNT: 16.4 % (ref 11.7–14.9)
GFR, ESTIMATED: 6 ML/MIN/1.73M2
GLUCOSE SERPL-MCNC: 125 MG/DL (ref 74–99)
GLUCOSE UR STRIP-MCNC: 100 MG/DL
HCT VFR BLD AUTO: 30.5 % (ref 42–52)
HGB BLD-MCNC: 9.5 G/DL (ref 13.5–18)
HGB UR QL STRIP.AUTO: ABNORMAL
IMM GRANULOCYTES # BLD AUTO: 0.04 K/UL
IMM GRANULOCYTES NFR BLD: 1 %
KETONES UR STRIP-MCNC: NEGATIVE MG/DL
LEUKOCYTE ESTERASE UR QL STRIP: NEGATIVE
LYMPHOCYTES NFR BLD: 1.41 K/UL
LYMPHOCYTES RELATIVE PERCENT: 30 % (ref 24–44)
MCH RBC QN AUTO: 27.5 PG (ref 27–31)
MCHC RBC AUTO-ENTMCNC: 31.1 G/DL (ref 32–36)
MCV RBC AUTO: 88.2 FL (ref 78–100)
MONOCYTES NFR BLD: 0.51 K/UL
MONOCYTES NFR BLD: 11 % (ref 0–4)
MUCOUS THREADS URNS QL MICRO: ABNORMAL
NEUTROPHILS NFR BLD: 56 % (ref 36–66)
NEUTS SEG NFR BLD: 2.64 K/UL
NITRITE UR QL STRIP: NEGATIVE
PH UR STRIP: 8.5 [PH] (ref 5–8)
PLATELET # BLD AUTO: 167 K/UL (ref 140–440)
PMV BLD AUTO: 9.8 FL (ref 7.5–11.1)
POTASSIUM SERPL-SCNC: 4.6 MMOL/L (ref 3.5–5.1)
PROT UR STRIP-MCNC: >=300 MG/DL
RBC # BLD AUTO: 3.46 M/UL (ref 4.6–6.2)
RBC #/AREA URNS HPF: 312 /HPF (ref 0–2)
SODIUM SERPL-SCNC: 137 MMOL/L (ref 136–145)
SP GR UR STRIP: 1.01 (ref 1–1.03)
TRICHOMONAS #/AREA URNS HPF: ABNORMAL /[HPF]
UROBILINOGEN UR STRIP-ACNC: 0.2 EU/DL (ref 0–1)
WBC #/AREA URNS HPF: 15 /HPF (ref 0–5)
WBC OTHER # BLD: 4.7 K/UL (ref 4–10.5)

## 2024-11-07 PROCEDURE — 51798 US URINE CAPACITY MEASURE: CPT

## 2024-11-07 PROCEDURE — 85025 COMPLETE CBC W/AUTO DIFF WBC: CPT

## 2024-11-07 PROCEDURE — 80048 BASIC METABOLIC PNL TOTAL CA: CPT

## 2024-11-07 PROCEDURE — 81001 URINALYSIS AUTO W/SCOPE: CPT

## 2024-11-07 PROCEDURE — 99284 EMERGENCY DEPT VISIT MOD MDM: CPT

## 2024-11-07 PROCEDURE — 74176 CT ABD & PELVIS W/O CONTRAST: CPT

## 2024-11-07 NOTE — DISCHARGE INSTRUCTIONS
Dear Victor Hugo Coughlin,    You were here for urinary retention .During your visit today you were screened for potentially life threatening illness and/or disease and have been found to be stable for discharge. However your visit is just a snap shot in time and can change. If at any point in time your illness progresses or symptoms worsen, or if you develop any other concerns that you feel require emergent care, please return to the Emergency Department.    Based on your work-up for today, you do not have any emergent needs requiring admission and are safe to discharge home with primary care, urology, and nephrology follow-up. You expressed feeling comfortable with this plan.    As discussed, your CT scan was reassuring of no acute findings.You need to follow up with the urology team     Please take all medications as instructed.  If you have any questions please ask the pharmacist or speak with your PCP.  Please return to the ER immediately if you have worsening symptoms,or any other symptoms that concern you.  These discharge instructions were given to you both verbally and written.    It has been my pleasure to take care of you and wishing you a speedy recovery,   Prabhu Cowan PA-C

## 2025-04-17 ENCOUNTER — TRANSCRIBE ORDERS (OUTPATIENT)
Dept: GENERAL RADIOLOGY | Age: 60
End: 2025-04-17

## 2025-04-17 ENCOUNTER — HOSPITAL ENCOUNTER (OUTPATIENT)
Dept: GENERAL RADIOLOGY | Age: 60
Discharge: HOME | End: 2025-04-17
Payer: COMMERCIAL

## 2025-04-17 DIAGNOSIS — M19.011 ARTHRITIS OF RIGHT SHOULDER: Primary | ICD-10-CM

## 2025-04-17 DIAGNOSIS — M19.011 ARTHRITIS OF RIGHT SHOULDER: ICD-10-CM

## 2025-04-17 PROCEDURE — 73030 X-RAY EXAM OF SHOULDER: CPT

## 2025-07-02 ENCOUNTER — HOSPITAL ENCOUNTER (OUTPATIENT)
Age: 60
Discharge: HOME | End: 2025-07-02
Payer: COMMERCIAL

## 2025-07-02 LAB — HGB BLD-MCNC: 9.5 G/DL (ref 13.5–18)

## 2025-07-02 PROCEDURE — 85018 HEMOGLOBIN: CPT
